# Patient Record
Sex: FEMALE | Race: WHITE | NOT HISPANIC OR LATINO | Employment: OTHER
[De-identification: names, ages, dates, MRNs, and addresses within clinical notes are randomized per-mention and may not be internally consistent; named-entity substitution may affect disease eponyms.]

---

## 2017-01-06 ENCOUNTER — HOSPITAL (OUTPATIENT)
Dept: OTHER | Age: 62
End: 2017-01-06
Attending: INTERNAL MEDICINE

## 2018-01-02 ENCOUNTER — MYAURORA ACCOUNT LINK (OUTPATIENT)
Dept: OTHER | Age: 63
End: 2018-01-02

## 2018-01-02 ENCOUNTER — CHARTING TRANS (OUTPATIENT)
Dept: OTHER | Age: 63
End: 2018-01-02

## 2018-01-27 ENCOUNTER — IMAGING SERVICES (OUTPATIENT)
Dept: OTHER | Age: 63
End: 2018-01-27

## 2018-01-27 ENCOUNTER — HOSPITAL (OUTPATIENT)
Dept: OTHER | Age: 63
End: 2018-01-27
Attending: INTERNAL MEDICINE

## 2018-01-29 ENCOUNTER — CHARTING TRANS (OUTPATIENT)
Dept: OTHER | Age: 63
End: 2018-01-29

## 2018-04-04 ENCOUNTER — HOSPITAL (OUTPATIENT)
Dept: OTHER | Age: 63
End: 2018-04-04
Attending: INTERNAL MEDICINE

## 2018-04-09 ENCOUNTER — LAB SERVICES (OUTPATIENT)
Dept: OTHER | Age: 63
End: 2018-04-09

## 2018-04-09 ENCOUNTER — CHARTING TRANS (OUTPATIENT)
Dept: OTHER | Age: 63
End: 2018-04-09

## 2018-04-09 LAB — RAPID STREP GROUP A: NORMAL

## 2018-04-09 ASSESSMENT — PAIN SCALES - GENERAL: PAINLEVEL_OUTOF10: 0

## 2018-05-18 ENCOUNTER — LAB SERVICES (OUTPATIENT)
Dept: OTHER | Age: 63
End: 2018-05-18

## 2018-05-18 ENCOUNTER — CHARTING TRANS (OUTPATIENT)
Dept: OTHER | Age: 63
End: 2018-05-18

## 2018-05-18 LAB — RAPID STREP GROUP A: NORMAL

## 2018-06-27 ENCOUNTER — IMAGING SERVICES (OUTPATIENT)
Dept: OTHER | Age: 63
End: 2018-06-27

## 2018-06-27 ENCOUNTER — HOSPITAL (OUTPATIENT)
Dept: OTHER | Age: 63
End: 2018-06-27
Attending: INTERNAL MEDICINE

## 2018-07-27 ENCOUNTER — CHARTING TRANS (OUTPATIENT)
Dept: OTHER | Age: 63
End: 2018-07-27

## 2018-07-27 ENCOUNTER — LAB SERVICES (OUTPATIENT)
Dept: OTHER | Age: 63
End: 2018-07-27

## 2018-07-27 ENCOUNTER — MYAURORA ACCOUNT LINK (OUTPATIENT)
Dept: OTHER | Age: 63
End: 2018-07-27

## 2018-07-28 ENCOUNTER — CHARTING TRANS (OUTPATIENT)
Dept: OTHER | Age: 63
End: 2018-07-28

## 2018-07-28 LAB
ALBUMIN SERPL-MCNC: 4 G/DL (ref 3.6–5.1)
ALBUMIN/GLOB SERPL: 1.4 (ref 1–2.4)
ALP SERPL-CCNC: 77 UNITS/L (ref 45–117)
ALT SERPL-CCNC: 27 UNITS/L
ANION GAP SERPL CALC-SCNC: 13 MMOL/L (ref 10–20)
AST SERPL-CCNC: 24 UNITS/L
BASOPHILS # BLD: 0.1 K/MCL (ref 0–0.3)
BASOPHILS NFR BLD: 1 %
BILIRUB SERPL-MCNC: 0.6 MG/DL (ref 0.2–1)
BUN SERPL-MCNC: 18 MG/DL (ref 6–20)
BUN/CREAT SERPL: 22 (ref 7–25)
CALCIUM SERPL-MCNC: 9.7 MG/DL (ref 8.4–10.2)
CHLORIDE SERPL-SCNC: 104 MMOL/L (ref 98–107)
CHOLEST SERPL-MCNC: 220 MG/DL
CHOLEST/HDLC SERPL: 3.5
CO2 SERPL-SCNC: 30 MMOL/L (ref 21–32)
CREAT SERPL-MCNC: 0.8 MG/DL (ref 0.51–0.95)
DIFFERENTIAL METHOD BLD: ABNORMAL
EOSINOPHIL # BLD: 0.1 K/MCL (ref 0.1–0.5)
EOSINOPHIL NFR BLD: 2 %
ERYTHROCYTE [DISTWIDTH] IN BLOOD: 13.7 % (ref 11–15)
GLOBULIN SER-MCNC: 2.9 G/DL (ref 2–4)
GLUCOSE SERPL-MCNC: 93 MG/DL (ref 65–99)
HDLC SERPL-MCNC: 62 MG/DL
HEMATOCRIT: 45.4 % (ref 36–46.5)
HEMOGLOBIN: 14.7 G/DL (ref 12–15.5)
IMM GRANULOCYTES # BLD AUTO: 0 K/MCL (ref 0–0.2)
IMM GRANULOCYTES NFR BLD: 0 %
LDLC SERPL CALC-MCNC: 138 MG/DL
LENGTH OF FAST TIME PATIENT: ABNORMAL HRS
LENGTH OF FAST TIME PATIENT: ABNORMAL HRS
LYMPHOCYTES # BLD: 1.5 K/MCL (ref 1–4)
LYMPHOCYTES NFR BLD: 28 %
MEAN CORPUSCULAR HEMOGLOBIN: 31.1 PG (ref 26–34)
MEAN CORPUSCULAR HGB CONC: 32.4 G/DL (ref 32–36.5)
MEAN CORPUSCULAR VOLUME: 96.2 FL (ref 78–100)
MONOCYTES # BLD: 0.5 K/MCL (ref 0.3–0.9)
MONOCYTES NFR BLD: 10 %
NEUTROPHILS # BLD: 3.1 K/MCL (ref 1.8–7.7)
NEUTROPHILS NFR BLD: 59 %
NONHDLC SERPL-MCNC: 158 MG/DL
NRBC (NRBCRE): 0 /100 WBC
PLATELET COUNT: 301 K/MCL (ref 140–450)
POTASSIUM SERPL-SCNC: 4.2 MMOL/L (ref 3.4–5.1)
RED CELL COUNT: 4.72 MIL/MCL (ref 4–5.2)
SODIUM SERPL-SCNC: 143 MMOL/L (ref 135–145)
TOTAL PROTEIN: 6.9 G/DL (ref 6.4–8.2)
TRIGL SERPL-MCNC: 101 MG/DL
TSH SERPL-ACNC: 1.97 MCUNITS/ML (ref 0.35–5)
WHITE BLOOD COUNT: 5.3 K/MCL (ref 4.2–11)

## 2018-10-31 VITALS
HEART RATE: 90 BPM | BODY MASS INDEX: 21.93 KG/M2 | HEIGHT: 65 IN | SYSTOLIC BLOOD PRESSURE: 110 MMHG | WEIGHT: 131.62 LBS | TEMPERATURE: 98.8 F | DIASTOLIC BLOOD PRESSURE: 64 MMHG

## 2018-11-01 VITALS
DIASTOLIC BLOOD PRESSURE: 68 MMHG | TEMPERATURE: 99.8 F | HEART RATE: 98 BPM | OXYGEN SATURATION: 98 % | RESPIRATION RATE: 16 BRPM | SYSTOLIC BLOOD PRESSURE: 100 MMHG

## 2018-11-01 VITALS
HEIGHT: 65 IN | HEART RATE: 90 BPM | BODY MASS INDEX: 21.99 KG/M2 | SYSTOLIC BLOOD PRESSURE: 139 MMHG | TEMPERATURE: 98.6 F | WEIGHT: 132 LBS | DIASTOLIC BLOOD PRESSURE: 68 MMHG | OXYGEN SATURATION: 98 %

## 2018-11-02 VITALS
SYSTOLIC BLOOD PRESSURE: 133 MMHG | DIASTOLIC BLOOD PRESSURE: 86 MMHG | WEIGHT: 138 LBS | HEART RATE: 79 BPM | TEMPERATURE: 96.1 F

## 2018-12-01 ENCOUNTER — PRIOR ORIGINAL RECORDS (OUTPATIENT)
Dept: HEALTH INFORMATION MANAGEMENT | Facility: OTHER | Age: 63
End: 2018-12-01

## 2018-12-26 ENCOUNTER — TELEPHONE (OUTPATIENT)
Dept: SCHEDULING | Age: 63
End: 2018-12-26

## 2018-12-27 RX ORDER — RANITIDINE 150 MG/1
TABLET ORAL
COMMUNITY
End: 2020-01-08 | Stop reason: ALTCHOICE

## 2018-12-27 RX ORDER — VIT A/VIT C/VIT E/ZINC/COPPER 4296-226
CAPSULE ORAL
COMMUNITY
End: 2021-09-21 | Stop reason: ALTCHOICE

## 2018-12-27 RX ORDER — AMITRIPTYLINE HYDROCHLORIDE 10 MG/1
TABLET, FILM COATED ORAL
COMMUNITY
Start: 2018-02-01 | End: 2019-02-03 | Stop reason: SDUPTHER

## 2018-12-27 RX ORDER — MOMETASONE FUROATE 50 UG/1
SPRAY, METERED NASAL
COMMUNITY
End: 2021-09-21 | Stop reason: ALTCHOICE

## 2018-12-27 RX ORDER — AZELASTINE 1 MG/ML
SPRAY, METERED NASAL
COMMUNITY

## 2018-12-27 RX ORDER — PAROXETINE HYDROCHLORIDE 20 MG/1
TABLET, FILM COATED ORAL
COMMUNITY
Start: 2018-06-11 | End: 2019-01-26 | Stop reason: SDUPTHER

## 2019-01-26 DIAGNOSIS — M79.7 FIBROMYALGIA: Primary | ICD-10-CM

## 2019-01-28 RX ORDER — PAROXETINE HYDROCHLORIDE 20 MG/1
TABLET, FILM COATED ORAL
Qty: 90 TABLET | Refills: 0 | Status: SHIPPED | OUTPATIENT
Start: 2019-01-28 | End: 2019-05-10 | Stop reason: SDUPTHER

## 2019-02-03 DIAGNOSIS — M79.7 FIBROMYALGIA: Primary | ICD-10-CM

## 2019-02-04 RX ORDER — AMITRIPTYLINE HYDROCHLORIDE 10 MG/1
TABLET, FILM COATED ORAL
Qty: 270 TABLET | Refills: 0 | Status: SHIPPED | OUTPATIENT
Start: 2019-02-04 | End: 2019-04-23 | Stop reason: SDUPTHER

## 2019-02-08 ENCOUNTER — OFFICE VISIT (OUTPATIENT)
Dept: RHEUMATOLOGY | Age: 64
End: 2019-02-08

## 2019-02-08 VITALS
HEART RATE: 81 BPM | TEMPERATURE: 97.7 F | DIASTOLIC BLOOD PRESSURE: 76 MMHG | SYSTOLIC BLOOD PRESSURE: 133 MMHG | BODY MASS INDEX: 22.66 KG/M2 | HEIGHT: 65 IN | WEIGHT: 136 LBS

## 2019-02-08 DIAGNOSIS — M79.7 FIBROMYALGIA SYNDROME: Primary | ICD-10-CM

## 2019-02-08 DIAGNOSIS — M85.80 OSTEOPENIA, UNSPECIFIED LOCATION: ICD-10-CM

## 2019-02-08 PROCEDURE — 99213 OFFICE O/P EST LOW 20 MIN: CPT | Performed by: INTERNAL MEDICINE

## 2019-02-08 RX ORDER — AMITRIPTYLINE HYDROCHLORIDE 10 MG/1
TABLET, FILM COATED ORAL
COMMUNITY
Start: 2018-11-26 | End: 2019-11-09 | Stop reason: SDUPTHER

## 2019-02-13 PROBLEM — M79.7 FIBROMYALGIA SYNDROME: Status: ACTIVE | Noted: 2019-02-13

## 2019-02-13 PROBLEM — M85.80 OSTEOPENIA: Status: ACTIVE | Noted: 2019-02-13

## 2019-02-13 SDOH — HEALTH STABILITY: MENTAL HEALTH: HOW OFTEN DO YOU HAVE A DRINK CONTAINING ALCOHOL?: NEVER

## 2019-03-27 ENCOUNTER — OFFICE VISIT (OUTPATIENT)
Dept: INTERNAL MEDICINE | Age: 64
End: 2019-03-27

## 2019-03-27 VITALS
BODY MASS INDEX: 21.99 KG/M2 | OXYGEN SATURATION: 97 % | WEIGHT: 132 LBS | HEART RATE: 84 BPM | DIASTOLIC BLOOD PRESSURE: 67 MMHG | TEMPERATURE: 97.8 F | HEIGHT: 65 IN | SYSTOLIC BLOOD PRESSURE: 132 MMHG

## 2019-03-27 DIAGNOSIS — M79.7 FIBROMYALGIA SYNDROME: ICD-10-CM

## 2019-03-27 DIAGNOSIS — E55.9 VITAMIN D DEFICIENCY: ICD-10-CM

## 2019-03-27 DIAGNOSIS — M75.82 TENDINITIS OF LEFT ROTATOR CUFF: Primary | ICD-10-CM

## 2019-03-27 DIAGNOSIS — M54.9 PAIN, UPPER BACK: ICD-10-CM

## 2019-03-27 PROBLEM — M85.859 OSTEOPENIA OF FEMORAL NECK: Status: ACTIVE | Noted: 2019-02-13

## 2019-03-27 PROBLEM — E78.00 PURE HYPERCHOLESTEROLEMIA: Status: ACTIVE | Noted: 2019-03-27

## 2019-03-27 PROBLEM — Z82.49 FAMILY HISTORY OF CARDIAC DISORDER: Status: ACTIVE | Noted: 2019-03-27

## 2019-03-27 PROBLEM — Z83.3 FH: DIABETES MELLITUS: Status: ACTIVE | Noted: 2019-03-27

## 2019-03-27 PROBLEM — K21.9 ESOPHAGEAL REFLUX: Status: ACTIVE | Noted: 2019-03-27

## 2019-03-27 PROBLEM — I78.1 NON-NEOPLASTIC NEVUS: Status: ACTIVE | Noted: 2019-03-27

## 2019-03-27 PROBLEM — J30.9 ALLERGIC RHINITIS: Status: ACTIVE | Noted: 2019-03-27

## 2019-03-27 PROBLEM — C53.9 MALIGNANT TUMOR OF CERVIX (CMD): Status: ACTIVE | Noted: 2019-03-27

## 2019-03-27 PROCEDURE — 99213 OFFICE O/P EST LOW 20 MIN: CPT | Performed by: INTERNAL MEDICINE

## 2019-03-27 RX ORDER — TIZANIDINE HYDROCHLORIDE 2 MG/1
2 CAPSULE, GELATIN COATED ORAL 3 TIMES DAILY
Qty: 30 CAPSULE | Refills: 0 | Status: SHIPPED | OUTPATIENT
Start: 2019-03-27 | End: 2019-11-26 | Stop reason: ALTCHOICE

## 2019-03-27 RX ORDER — NAPROXEN 500 MG/1
500 TABLET ORAL 2 TIMES DAILY
Qty: 28 TABLET | Refills: 0 | Status: SHIPPED | OUTPATIENT
Start: 2019-03-27 | End: 2020-01-08 | Stop reason: ALTCHOICE

## 2019-03-27 ASSESSMENT — ENCOUNTER SYMPTOMS
FEVER: 0
ACTIVITY CHANGE: 0
COUGH: 0
SHORTNESS OF BREATH: 0
FATIGUE: 0

## 2019-03-28 ENCOUNTER — HOSPITAL (OUTPATIENT)
Dept: OTHER | Age: 64
End: 2019-03-28

## 2019-03-28 LAB — 25(OH)D3+25(OH)D2 SERPL-MCNC: 21.2 NG/ML (ref 30–100)

## 2019-04-01 ENCOUNTER — HOSPITAL (OUTPATIENT)
Dept: OTHER | Age: 64
End: 2019-04-01

## 2019-04-12 ENCOUNTER — TELEPHONE (OUTPATIENT)
Dept: FAMILY MEDICINE | Age: 64
End: 2019-04-12

## 2019-04-23 DIAGNOSIS — M79.7 FIBROMYALGIA: ICD-10-CM

## 2019-04-23 RX ORDER — AMITRIPTYLINE HYDROCHLORIDE 10 MG/1
TABLET, FILM COATED ORAL
Qty: 270 TABLET | Refills: 0 | Status: SHIPPED | OUTPATIENT
Start: 2019-04-23 | End: 2019-07-11 | Stop reason: SDUPTHER

## 2019-05-01 ENCOUNTER — HOSPITAL (OUTPATIENT)
Dept: OTHER | Age: 64
End: 2019-05-01

## 2019-05-10 ENCOUNTER — TELEPHONE (OUTPATIENT)
Dept: SCHEDULING | Age: 64
End: 2019-05-10

## 2019-05-10 DIAGNOSIS — M79.7 FIBROMYALGIA: ICD-10-CM

## 2019-05-10 RX ORDER — PAROXETINE HYDROCHLORIDE 20 MG/1
20 TABLET, FILM COATED ORAL SEE ADMIN INSTRUCTIONS
Qty: 90 TABLET | Refills: 0 | Status: SHIPPED | OUTPATIENT
Start: 2019-05-10 | End: 2019-08-22 | Stop reason: SDUPTHER

## 2019-06-01 ENCOUNTER — HOSPITAL (OUTPATIENT)
Dept: OTHER | Age: 64
End: 2019-06-01
Attending: INTERNAL MEDICINE

## 2019-07-11 DIAGNOSIS — M79.7 FIBROMYALGIA: ICD-10-CM

## 2019-07-11 RX ORDER — AMITRIPTYLINE HYDROCHLORIDE 10 MG/1
TABLET, FILM COATED ORAL
Qty: 270 TABLET | Refills: 0 | Status: SHIPPED | OUTPATIENT
Start: 2019-07-11 | End: 2019-08-22 | Stop reason: SDUPTHER

## 2019-08-15 ENCOUNTER — HOSPITAL (OUTPATIENT)
Dept: OTHER | Age: 64
End: 2019-08-15
Attending: INTERNAL MEDICINE

## 2019-08-15 DIAGNOSIS — R92.2 DENSE BREASTS: Primary | ICD-10-CM

## 2019-08-15 DIAGNOSIS — Z12.39 BREAST CANCER SCREENING OTHER THAN MAMMOGRAM: ICD-10-CM

## 2019-08-15 DIAGNOSIS — R92.30 DENSE BREASTS: Primary | ICD-10-CM

## 2019-08-22 ENCOUNTER — OFFICE VISIT (OUTPATIENT)
Dept: RHEUMATOLOGY | Age: 64
End: 2019-08-22

## 2019-08-22 VITALS
BODY MASS INDEX: 21.99 KG/M2 | SYSTOLIC BLOOD PRESSURE: 130 MMHG | HEIGHT: 65 IN | TEMPERATURE: 98.4 F | DIASTOLIC BLOOD PRESSURE: 75 MMHG | WEIGHT: 132 LBS | HEART RATE: 89 BPM

## 2019-08-22 DIAGNOSIS — M79.7 FIBROMYALGIA: ICD-10-CM

## 2019-08-22 DIAGNOSIS — M85.89 OSTEOPENIA OF MULTIPLE SITES: Primary | ICD-10-CM

## 2019-08-22 PROCEDURE — 99213 OFFICE O/P EST LOW 20 MIN: CPT | Performed by: INTERNAL MEDICINE

## 2019-08-22 RX ORDER — PAROXETINE HYDROCHLORIDE 20 MG/1
20 TABLET, FILM COATED ORAL SEE ADMIN INSTRUCTIONS
Qty: 90 TABLET | Refills: 1 | Status: SHIPPED | OUTPATIENT
Start: 2019-08-22 | End: 2020-01-08 | Stop reason: ALTCHOICE

## 2019-08-22 RX ORDER — AMITRIPTYLINE HYDROCHLORIDE 10 MG/1
30 TABLET, FILM COATED ORAL NIGHTLY
Qty: 270 TABLET | Refills: 1 | Status: SHIPPED | OUTPATIENT
Start: 2019-08-22 | End: 2020-03-16

## 2019-09-10 ENCOUNTER — TELEPHONE (OUTPATIENT)
Dept: SCHEDULING | Age: 64
End: 2019-09-10

## 2019-09-13 ENCOUNTER — WALK IN (OUTPATIENT)
Dept: URGENT CARE | Age: 64
End: 2019-09-13

## 2019-09-13 DIAGNOSIS — Z23 NEED FOR IMMUNIZATION AGAINST INFLUENZA: ICD-10-CM

## 2019-09-13 DIAGNOSIS — Z11.1 SCREENING-PULMONARY TB: Primary | ICD-10-CM

## 2019-09-13 PROCEDURE — G0008 ADMIN INFLUENZA VIRUS VAC: HCPCS | Performed by: NURSE PRACTITIONER

## 2019-09-13 PROCEDURE — 90662 IIV NO PRSV INCREASED AG IM: CPT | Performed by: NURSE PRACTITIONER

## 2019-09-13 PROCEDURE — 86580 TB INTRADERMAL TEST: CPT | Performed by: NURSE PRACTITIONER

## 2019-09-14 ENCOUNTER — TELEPHONE (OUTPATIENT)
Dept: SCHEDULING | Age: 64
End: 2019-09-14

## 2019-09-16 ENCOUNTER — WALK IN (OUTPATIENT)
Dept: URGENT CARE | Age: 64
End: 2019-09-16

## 2019-09-16 DIAGNOSIS — Z11.1 ENCOUNTER FOR PPD SKIN TEST READING: Primary | ICD-10-CM

## 2019-09-16 LAB
INDURATION: 0 MM (ref 0–?)
SKIN TEST RESULT: NEGATIVE

## 2019-09-16 PROCEDURE — X1094 NO CHARGE VISIT: HCPCS | Performed by: NURSE PRACTITIONER

## 2019-11-09 PROBLEM — M85.89 OSTEOPENIA OF MULTIPLE SITES: Status: ACTIVE | Noted: 2019-11-09

## 2019-11-26 ENCOUNTER — OFFICE VISIT (OUTPATIENT)
Dept: GASTROENTEROLOGY | Age: 64
End: 2019-11-26

## 2019-11-26 VITALS
HEART RATE: 88 BPM | SYSTOLIC BLOOD PRESSURE: 116 MMHG | BODY MASS INDEX: 22.42 KG/M2 | WEIGHT: 134.6 LBS | DIASTOLIC BLOOD PRESSURE: 67 MMHG | HEIGHT: 65 IN

## 2019-11-26 DIAGNOSIS — R12 HEARTBURN: Primary | ICD-10-CM

## 2019-11-26 DIAGNOSIS — Z12.11 SPECIAL SCREENING FOR MALIGNANT NEOPLASMS, COLON: ICD-10-CM

## 2019-11-26 PROCEDURE — 99204 OFFICE O/P NEW MOD 45 MIN: CPT | Performed by: INTERNAL MEDICINE

## 2019-11-26 RX ORDER — OMEPRAZOLE 40 MG/1
40 CAPSULE, DELAYED RELEASE ORAL DAILY
Qty: 30 CAPSULE | Refills: 5 | Status: SHIPPED | OUTPATIENT
Start: 2019-11-26 | End: 2019-12-27 | Stop reason: SDUPTHER

## 2019-11-26 RX ORDER — ESOMEPRAZOLE MAGNESIUM 20 MG/1
TABLET, DELAYED RELEASE ORAL
COMMUNITY
End: 2020-01-08 | Stop reason: ALTCHOICE

## 2019-11-26 RX ORDER — PREDNISONE 5 MG/1
TABLET ORAL
COMMUNITY
Start: 2019-11-16 | End: 2020-01-08 | Stop reason: ALTCHOICE

## 2019-11-26 RX ORDER — POLYETHYLENE GLYCOL 3350, SODIUM CHLORIDE, SODIUM BICARBONATE, POTASSIUM CHLORIDE 420; 11.2; 5.72; 1.48 G/4L; G/4L; G/4L; G/4L
POWDER, FOR SOLUTION ORAL
Qty: 4000 ML | Refills: 0 | Status: SHIPPED | OUTPATIENT
Start: 2019-11-26 | End: 2020-01-08 | Stop reason: ALTCHOICE

## 2019-11-26 SDOH — HEALTH STABILITY: MENTAL HEALTH: HOW OFTEN DO YOU HAVE A DRINK CONTAINING ALCOHOL?: NOT ASKED

## 2019-12-16 ENCOUNTER — HOSPITAL (OUTPATIENT)
Dept: OTHER | Age: 64
End: 2019-12-16

## 2019-12-16 PROCEDURE — G0121 COLON CA SCRN NOT HI RSK IND: HCPCS | Performed by: INTERNAL MEDICINE

## 2019-12-16 PROCEDURE — 43239 EGD BIOPSY SINGLE/MULTIPLE: CPT | Performed by: INTERNAL MEDICINE

## 2019-12-18 ENCOUNTER — TELEPHONE (OUTPATIENT)
Dept: SCHEDULING | Age: 64
End: 2019-12-18

## 2019-12-18 LAB — PATHOLOGIST NAME: NORMAL

## 2019-12-20 ENCOUNTER — E-ADVICE (OUTPATIENT)
Dept: INTERNAL MEDICINE | Age: 64
End: 2019-12-20

## 2019-12-20 ENCOUNTER — E-ADVICE (OUTPATIENT)
Dept: GASTROENTEROLOGY | Age: 64
End: 2019-12-20

## 2019-12-24 ENCOUNTER — TELEPHONE (OUTPATIENT)
Dept: SCHEDULING | Age: 64
End: 2019-12-24

## 2019-12-27 ENCOUNTER — TELEPHONE (OUTPATIENT)
Dept: GASTROENTEROLOGY | Age: 64
End: 2019-12-27

## 2019-12-27 RX ORDER — OMEPRAZOLE 40 MG/1
40 CAPSULE, DELAYED RELEASE ORAL DAILY
Qty: 90 CAPSULE | Refills: 3 | Status: SHIPPED | OUTPATIENT
Start: 2019-12-27 | End: 2021-03-05 | Stop reason: SDUPTHER

## 2019-12-31 ENCOUNTER — TELEPHONE (OUTPATIENT)
Dept: SCHEDULING | Age: 64
End: 2019-12-31

## 2019-12-31 DIAGNOSIS — M85.89 OSTEOPENIA OF MULTIPLE SITES: Primary | ICD-10-CM

## 2020-01-08 ENCOUNTER — OFFICE VISIT (OUTPATIENT)
Dept: FAMILY MEDICINE | Age: 65
End: 2020-01-08

## 2020-01-08 VITALS
DIASTOLIC BLOOD PRESSURE: 73 MMHG | SYSTOLIC BLOOD PRESSURE: 121 MMHG | OXYGEN SATURATION: 99 % | HEIGHT: 65 IN | RESPIRATION RATE: 16 BRPM | HEART RATE: 89 BPM | TEMPERATURE: 98 F | BODY MASS INDEX: 22.41 KG/M2 | WEIGHT: 134.48 LBS

## 2020-01-08 DIAGNOSIS — E78.00 PURE HYPERCHOLESTEROLEMIA: ICD-10-CM

## 2020-01-08 DIAGNOSIS — E55.9 VITAMIN D DEFICIENCY: Primary | ICD-10-CM

## 2020-01-08 LAB
25(OH)D3+25(OH)D2 SERPL-MCNC: 40.9 NG/ML (ref 30–100)
ALBUMIN SERPL-MCNC: 4.2 G/DL (ref 3.6–5.1)
ALBUMIN/GLOB SERPL: 1.7 {RATIO} (ref 1–2.4)
ALP SERPL-CCNC: 80 UNITS/L (ref 45–117)
ALT SERPL-CCNC: 22 UNITS/L
ANION GAP SERPL CALC-SCNC: 10 MMOL/L (ref 10–20)
AST SERPL-CCNC: 20 UNITS/L
BASOPHILS # BLD AUTO: 0.1 K/MCL (ref 0–0.3)
BASOPHILS NFR BLD AUTO: 1 %
BILIRUB SERPL-MCNC: 0.6 MG/DL (ref 0.2–1)
BUN SERPL-MCNC: 19 MG/DL (ref 6–20)
BUN/CREAT SERPL: 27 (ref 7–25)
CALCIUM SERPL-MCNC: 9.6 MG/DL (ref 8.4–10.2)
CHLORIDE SERPL-SCNC: 104 MMOL/L (ref 98–107)
CHOLEST SERPL-MCNC: 230 MG/DL
CHOLEST/HDLC SERPL: 3.5 {RATIO}
CO2 SERPL-SCNC: 29 MMOL/L (ref 21–32)
CREAT SERPL-MCNC: 0.71 MG/DL (ref 0.51–0.95)
DIFFERENTIAL METHOD BLD: NORMAL
EOSINOPHIL # BLD AUTO: 0.2 K/MCL (ref 0.1–0.5)
EOSINOPHIL NFR SPEC: 3 %
ERYTHROCYTE [DISTWIDTH] IN BLOOD: 13.6 % (ref 11–15)
FASTING STATUS PATIENT QL REPORTED: ABNORMAL HRS
GLOBULIN SER-MCNC: 2.5 G/DL (ref 2–4)
GLUCOSE SERPL-MCNC: 88 MG/DL (ref 65–99)
HCT VFR BLD CALC: 43.3 % (ref 36–46.5)
HDLC SERPL-MCNC: 65 MG/DL
HGB BLD-MCNC: 14.2 G/DL (ref 12–15.5)
IMM GRANULOCYTES # BLD AUTO: 0 K/MCL (ref 0–0.2)
IMM GRANULOCYTES NFR BLD: 0 %
LDLC SERPL-MCNC: 148 MG/DL
LENGTH OF FAST TIME PATIENT: ABNORMAL HRS
LYMPHOCYTES # BLD MANUAL: 1.4 K/MCL (ref 1–4)
LYMPHOCYTES NFR BLD MANUAL: 21 %
MCH RBC QN AUTO: 31.5 PG (ref 26–34)
MCHC RBC AUTO-ENTMCNC: 32.8 G/DL (ref 32–36.5)
MCV RBC AUTO: 96 FL (ref 78–100)
MONOCYTES # BLD MANUAL: 0.7 K/MCL (ref 0.3–0.9)
MONOCYTES NFR BLD MANUAL: 10 %
NEUTROPHILS # BLD: 4.5 K/MCL (ref 1.8–7.7)
NEUTROPHILS NFR BLD AUTO: 65 %
NONHDLC SERPL-MCNC: 165 MG/DL
NRBC BLD MANUAL-RTO: 0 /100 WBC
PLATELET # BLD: 221 K/MCL (ref 140–450)
POTASSIUM SERPL-SCNC: 3.9 MMOL/L (ref 3.4–5.1)
PROT SERPL-MCNC: 6.7 G/DL (ref 6.4–8.2)
RBC # BLD: 4.51 MIL/MCL (ref 4–5.2)
SODIUM SERPL-SCNC: 139 MMOL/L (ref 135–145)
TRIGL SERPL-MCNC: 85 MG/DL
WBC # BLD: 6.8 K/MCL (ref 4.2–11)

## 2020-01-08 PROCEDURE — 99214 OFFICE O/P EST MOD 30 MIN: CPT | Performed by: NURSE PRACTITIONER

## 2020-01-14 ENCOUNTER — OFFICE VISIT (OUTPATIENT)
Dept: FAMILY MEDICINE | Age: 65
End: 2020-01-14

## 2020-01-14 VITALS — DIASTOLIC BLOOD PRESSURE: 69 MMHG | OXYGEN SATURATION: 98 % | HEART RATE: 91 BPM | SYSTOLIC BLOOD PRESSURE: 134 MMHG

## 2020-01-14 DIAGNOSIS — S46.812A STRAIN OF LEFT TRAPEZIUS MUSCLE, INITIAL ENCOUNTER: ICD-10-CM

## 2020-01-14 DIAGNOSIS — S46.912A STRAIN OF LEFT SHOULDER, INITIAL ENCOUNTER: Primary | ICD-10-CM

## 2020-01-14 PROCEDURE — 99214 OFFICE O/P EST MOD 30 MIN: CPT | Performed by: NURSE PRACTITIONER

## 2020-01-14 RX ORDER — PAROXETINE HYDROCHLORIDE 20 MG/1
20 TABLET, FILM COATED ORAL EVERY MORNING
COMMUNITY
End: 2020-03-16

## 2020-01-17 ENCOUNTER — HOSPITAL (OUTPATIENT)
Dept: OTHER | Age: 65
End: 2020-01-17
Attending: NURSE PRACTITIONER

## 2020-01-24 ENCOUNTER — HOSPITAL (OUTPATIENT)
Dept: OTHER | Age: 65
End: 2020-01-24
Attending: INTERNAL MEDICINE

## 2020-02-10 ENCOUNTER — HOSPITAL ENCOUNTER (OUTPATIENT)
Dept: ULTRASOUND IMAGING | Age: 65
Discharge: HOME | End: 2020-02-10
Attending: INTERNAL MEDICINE

## 2020-02-10 ENCOUNTER — APPOINTMENT (OUTPATIENT)
Dept: GENERAL RADIOLOGY | Age: 65
End: 2020-02-10
Attending: INTERNAL MEDICINE

## 2020-02-10 DIAGNOSIS — R92.2 DENSE BREASTS: ICD-10-CM

## 2020-02-10 DIAGNOSIS — Z12.39 BREAST CANCER SCREENING OTHER THAN MAMMOGRAM: ICD-10-CM

## 2020-02-10 DIAGNOSIS — R92.30 DENSE BREASTS: ICD-10-CM

## 2020-02-10 PROCEDURE — 76641 ULTRASOUND BREAST COMPLETE: CPT

## 2020-02-21 ENCOUNTER — HOSPITAL ENCOUNTER (OUTPATIENT)
Dept: GENERAL RADIOLOGY | Age: 65
Discharge: HOME | End: 2020-02-21
Attending: INTERNAL MEDICINE

## 2020-02-21 ENCOUNTER — APPOINTMENT (OUTPATIENT)
Dept: GENERAL RADIOLOGY | Age: 65
End: 2020-02-21
Attending: INTERNAL MEDICINE

## 2020-02-21 DIAGNOSIS — M85.89 OSTEOPENIA OF MULTIPLE SITES: ICD-10-CM

## 2020-02-21 PROCEDURE — 77080 DXA BONE DENSITY AXIAL: CPT

## 2020-02-25 ENCOUNTER — HOSPITAL ENCOUNTER (OUTPATIENT)
Dept: PHYSICAL MEDICINE AND REHAB | Age: 65
Discharge: STILL ADMITTED OR ADMINISTRATIVE | End: 2020-02-25
Attending: INTERNAL MEDICINE

## 2020-02-25 DIAGNOSIS — M75.82 TENDINITIS OF LEFT ROTATOR CUFF: ICD-10-CM

## 2020-02-25 PROCEDURE — 97110 THERAPEUTIC EXERCISES: CPT | Performed by: PHYSICAL THERAPIST

## 2020-02-25 PROCEDURE — 97140 MANUAL THERAPY 1/> REGIONS: CPT | Performed by: PHYSICAL THERAPIST

## 2020-02-25 PROCEDURE — 97032 APPL MODALITY 1+ESTIM EA 15: CPT | Performed by: PHYSICAL THERAPIST

## 2020-03-16 DIAGNOSIS — M79.7 FIBROMYALGIA: ICD-10-CM

## 2020-03-16 RX ORDER — PAROXETINE HYDROCHLORIDE 20 MG/1
TABLET, FILM COATED ORAL
Qty: 90 TABLET | Refills: 3 | Status: SHIPPED | OUTPATIENT
Start: 2020-03-16 | End: 2021-03-02

## 2020-03-16 RX ORDER — AMITRIPTYLINE HYDROCHLORIDE 10 MG/1
TABLET, FILM COATED ORAL
Qty: 270 TABLET | Refills: 3 | Status: SHIPPED | OUTPATIENT
Start: 2020-03-16 | End: 2021-03-02

## 2020-03-17 ENCOUNTER — APPOINTMENT (OUTPATIENT)
Dept: PHYSICAL MEDICINE AND REHAB | Age: 65
End: 2020-03-17
Attending: INTERNAL MEDICINE

## 2020-03-18 ENCOUNTER — TELEPHONE (OUTPATIENT)
Dept: SCHEDULING | Age: 65
End: 2020-03-18

## 2020-03-20 ENCOUNTER — TELEPHONE (OUTPATIENT)
Dept: RHEUMATOLOGY | Age: 65
End: 2020-03-20

## 2020-03-23 ENCOUNTER — APPOINTMENT (OUTPATIENT)
Dept: RHEUMATOLOGY | Age: 65
End: 2020-03-23

## 2020-03-24 ENCOUNTER — TELEPHONE (OUTPATIENT)
Dept: PHYSICAL MEDICINE AND REHAB | Age: 65
End: 2020-03-24

## 2020-03-24 ENCOUNTER — APPOINTMENT (OUTPATIENT)
Dept: PHYSICAL MEDICINE AND REHAB | Age: 65
End: 2020-03-24
Attending: INTERNAL MEDICINE

## 2020-03-24 PROCEDURE — 98968 PH1 ASSMT&MGMT NQHP 21-30: CPT | Performed by: PHYSICAL THERAPIST

## 2020-03-31 ENCOUNTER — APPOINTMENT (OUTPATIENT)
Dept: PHYSICAL MEDICINE AND REHAB | Age: 65
End: 2020-03-31
Attending: INTERNAL MEDICINE

## 2020-08-05 ENCOUNTER — TELEPHONE (OUTPATIENT)
Dept: SCHEDULING | Age: 65
End: 2020-08-05

## 2020-08-28 ENCOUNTER — APPOINTMENT (OUTPATIENT)
Dept: RHEUMATOLOGY | Age: 65
End: 2020-08-28

## 2020-08-28 ENCOUNTER — TELEPHONE (OUTPATIENT)
Dept: SCHEDULING | Age: 65
End: 2020-08-28

## 2020-09-03 ENCOUNTER — TELEPHONE (OUTPATIENT)
Dept: SCHEDULING | Age: 65
End: 2020-09-03

## 2020-09-03 ENCOUNTER — V-VISIT (OUTPATIENT)
Dept: RHEUMATOLOGY | Age: 65
End: 2020-09-03

## 2020-09-03 DIAGNOSIS — M65.331 ACQUIRED TRIGGER FINGER OF BOTH MIDDLE FINGERS: ICD-10-CM

## 2020-09-03 DIAGNOSIS — M65.332 ACQUIRED TRIGGER FINGER OF BOTH MIDDLE FINGERS: ICD-10-CM

## 2020-09-03 DIAGNOSIS — M79.7 FIBROMYALGIA SYNDROME: Primary | ICD-10-CM

## 2020-09-03 DIAGNOSIS — M85.89 OSTEOPENIA OF MULTIPLE SITES: ICD-10-CM

## 2020-09-03 PROCEDURE — 99213 OFFICE O/P EST LOW 20 MIN: CPT | Performed by: INTERNAL MEDICINE

## 2020-09-07 PROBLEM — M79.7 FIBROMYALGIA SYNDROME: Status: ACTIVE | Noted: 2020-09-07

## 2020-09-07 PROBLEM — M65.332 ACQUIRED TRIGGER FINGER OF BOTH MIDDLE FINGERS: Status: ACTIVE | Noted: 2020-09-07

## 2020-09-07 PROBLEM — M65.322 ACQUIRED TRIGGER FINGER OF BOTH INDEX FINGERS: Status: ACTIVE | Noted: 2020-09-07

## 2020-09-07 PROBLEM — M65.331 ACQUIRED TRIGGER FINGER OF BOTH MIDDLE FINGERS: Status: ACTIVE | Noted: 2020-09-07

## 2020-09-07 PROBLEM — M65.321 ACQUIRED TRIGGER FINGER OF BOTH INDEX FINGERS: Status: ACTIVE | Noted: 2020-09-07

## 2020-09-11 ENCOUNTER — HOSPITAL ENCOUNTER (OUTPATIENT)
Dept: MAMMOGRAPHY | Age: 65
Discharge: HOME | End: 2020-09-11
Attending: INTERNAL MEDICINE

## 2020-09-11 DIAGNOSIS — Z12.31 ENCOUNTER FOR SCREENING MAMMOGRAM FOR MALIGNANT NEOPLASM OF BREAST: ICD-10-CM

## 2020-09-11 PROCEDURE — 77063 BREAST TOMOSYNTHESIS BI: CPT

## 2020-09-12 DIAGNOSIS — R92.30 DENSE BREASTS: ICD-10-CM

## 2020-09-12 DIAGNOSIS — Z12.39 ENCOUNTER FOR OTHER SCREENING FOR MALIGNANT NEOPLASM OF BREAST: Primary | ICD-10-CM

## 2020-09-12 DIAGNOSIS — R92.2 DENSE BREASTS: ICD-10-CM

## 2020-09-24 ENCOUNTER — OFFICE VISIT (OUTPATIENT)
Dept: INTERNAL MEDICINE | Age: 65
End: 2020-09-24

## 2020-09-24 VITALS
WEIGHT: 132.28 LBS | HEART RATE: 86 BPM | SYSTOLIC BLOOD PRESSURE: 125 MMHG | TEMPERATURE: 96.6 F | OXYGEN SATURATION: 98 % | HEIGHT: 65 IN | BODY MASS INDEX: 22.04 KG/M2 | DIASTOLIC BLOOD PRESSURE: 59 MMHG

## 2020-09-24 DIAGNOSIS — E78.00 PURE HYPERCHOLESTEROLEMIA: ICD-10-CM

## 2020-09-24 DIAGNOSIS — Z23 FLU VACCINE NEED: Primary | ICD-10-CM

## 2020-09-24 DIAGNOSIS — Z91.09 ENVIRONMENTAL ALLERGIES: ICD-10-CM

## 2020-09-24 DIAGNOSIS — C53.9 MALIGNANT NEOPLASM OF CERVIX, UNSPECIFIED SITE (CMD): ICD-10-CM

## 2020-09-24 DIAGNOSIS — Z23 NEED FOR PNEUMOCOCCAL VACCINATION: ICD-10-CM

## 2020-09-24 DIAGNOSIS — M79.7 FIBROMYALGIA: ICD-10-CM

## 2020-09-24 DIAGNOSIS — Z11.59 NEED FOR HEPATITIS C SCREENING TEST: ICD-10-CM

## 2020-09-24 PROBLEM — M65.322 ACQUIRED TRIGGER FINGER OF BOTH INDEX FINGERS: Status: RESOLVED | Noted: 2020-09-07 | Resolved: 2020-09-24

## 2020-09-24 PROBLEM — M75.82 TENDINITIS OF LEFT ROTATOR CUFF: Status: RESOLVED | Noted: 2019-03-27 | Resolved: 2020-09-24

## 2020-09-24 PROBLEM — M65.321 ACQUIRED TRIGGER FINGER OF BOTH INDEX FINGERS: Status: RESOLVED | Noted: 2020-09-07 | Resolved: 2020-09-24

## 2020-09-24 PROBLEM — M54.9 PAIN, UPPER BACK: Status: RESOLVED | Noted: 2019-03-27 | Resolved: 2020-09-24

## 2020-09-24 LAB
CHOLEST SERPL-MCNC: 210 MG/DL
CHOLEST/HDLC SERPL: 3.6 {RATIO}
HDLC SERPL-MCNC: 59 MG/DL
LDLC SERPL CALC-MCNC: 129 MG/DL
LENGTH OF FAST TIME PATIENT: ABNORMAL HRS
NONHDLC SERPL-MCNC: 151 MG/DL
TRIGL SERPL-MCNC: 112 MG/DL

## 2020-09-24 PROCEDURE — 99214 OFFICE O/P EST MOD 30 MIN: CPT | Performed by: INTERNAL MEDICINE

## 2020-09-24 PROCEDURE — G0008 ADMIN INFLUENZA VIRUS VAC: HCPCS

## 2020-09-24 PROCEDURE — G0009 ADMIN PNEUMOCOCCAL VACCINE: HCPCS

## 2020-09-24 PROCEDURE — 90662 IIV NO PRSV INCREASED AG IM: CPT

## 2020-09-24 PROCEDURE — 90670 PCV13 VACCINE IM: CPT

## 2020-09-24 RX ORDER — PREDNISONE 5 MG/1
TABLET ORAL
COMMUNITY
Start: 2020-09-02 | End: 2020-12-30 | Stop reason: ALTCHOICE

## 2020-09-24 SDOH — HEALTH STABILITY: MENTAL HEALTH: HOW OFTEN DO YOU HAVE A DRINK CONTAINING ALCOHOL?: NOT ASKED

## 2020-09-24 ASSESSMENT — PATIENT HEALTH QUESTIONNAIRE - PHQ9
2. FEELING DOWN, DEPRESSED OR HOPELESS: NOT AT ALL
CLINICAL INTERPRETATION OF PHQ2 SCORE: NO FURTHER SCREENING NEEDED
SUM OF ALL RESPONSES TO PHQ9 QUESTIONS 1 AND 2: 0
1. LITTLE INTEREST OR PLEASURE IN DOING THINGS: NOT AT ALL
CLINICAL INTERPRETATION OF PHQ9 SCORE: NO FURTHER SCREENING NEEDED
SUM OF ALL RESPONSES TO PHQ9 QUESTIONS 1 AND 2: 0

## 2020-09-24 ASSESSMENT — ENCOUNTER SYMPTOMS
LIGHT-HEADEDNESS: 0
COUGH: 0
FATIGUE: 0
FEVER: 0
SLEEP DISTURBANCE: 0
ACTIVITY CHANGE: 0
BRUISES/BLEEDS EASILY: 0
WEAKNESS: 0
DIARRHEA: 0
DIZZINESS: 0
SORE THROAT: 0
CONSTIPATION: 0
BLOOD IN STOOL: 0
SHORTNESS OF BREATH: 0
ABDOMINAL PAIN: 0

## 2020-09-25 LAB — HCV AB SER QL: NEGATIVE

## 2020-11-23 ENCOUNTER — HOSPITAL ENCOUNTER (OUTPATIENT)
Dept: LAB | Age: 65
Discharge: HOME | End: 2020-11-23
Attending: ORTHOPAEDIC SURGERY

## 2020-11-23 DIAGNOSIS — M06.9 RHEUMATOID ARTHRITIS, INVOLVING UNSPECIFIED SITE, UNSPECIFIED WHETHER RHEUMATOID FACTOR PRESENT (CMD): Primary | ICD-10-CM

## 2020-11-23 LAB
ALBUMIN SERPL-MCNC: 4 G/DL (ref 3.6–5.1)
ALBUMIN/GLOB SERPL: 1.5 {RATIO} (ref 1–2.4)
ALP SERPL-CCNC: 90 UNITS/L (ref 45–117)
ALT SERPL-CCNC: 34 UNITS/L
ANION GAP SERPL CALC-SCNC: 7 MMOL/L (ref 10–20)
AST SERPL-CCNC: 24 UNITS/L
BASOPHILS # BLD: 0.1 K/MCL (ref 0–0.3)
BASOPHILS NFR BLD: 1 %
BILIRUB SERPL-MCNC: 0.4 MG/DL (ref 0.2–1)
BUN SERPL-MCNC: 18 MG/DL (ref 6–20)
BUN/CREAT SERPL: 25 (ref 7–25)
CALCIUM SERPL-MCNC: 9.4 MG/DL (ref 8.4–10.2)
CHLORIDE SERPL-SCNC: 105 MMOL/L (ref 98–107)
CO2 SERPL-SCNC: 32 MMOL/L (ref 21–32)
CREAT SERPL-MCNC: 0.72 MG/DL (ref 0.51–0.95)
CRP SERPL-MCNC: <0.3 MG/DL
DIFFERENTIAL METHOD BLD: NORMAL
EOSINOPHIL # BLD: 0.1 K/MCL (ref 0.1–0.5)
EOSINOPHIL NFR BLD: 2 %
ERYTHROCYTE [DISTWIDTH] IN BLOOD: 12.7 % (ref 11–15)
ERYTHROCYTE [SEDIMENTATION RATE] IN BLOOD BY WESTERGREN METHOD: 12 MM/HR (ref 0–20)
GLOBULIN SER-MCNC: 2.7 G/DL (ref 2–4)
GLUCOSE SERPL-MCNC: 70 MG/DL (ref 65–99)
HCT VFR BLD CALC: 39.2 % (ref 36–46.5)
HGB BLD-MCNC: 13.3 G/DL (ref 12–15.5)
IMM GRANULOCYTES # BLD AUTO: 0 K/MCL (ref 0–0.2)
IMM GRANULOCYTES NFR BLD: 0 %
LYMPHOCYTES # BLD: 1.5 K/MCL (ref 1–4)
LYMPHOCYTES NFR BLD: 23 %
MCH RBC QN AUTO: 32 PG (ref 26–34)
MCHC RBC AUTO-ENTMCNC: 33.9 G/DL (ref 32–36.5)
MCV RBC AUTO: 94.5 FL (ref 78–100)
MONOCYTES # BLD: 0.5 K/MCL (ref 0.3–0.9)
MONOCYTES NFR BLD: 8 %
NEUTROPHILS # BLD: 4.2 K/MCL (ref 1.8–7.7)
NEUTROPHILS NFR BLD: 66 %
NRBC BLD MANUAL-RTO: 0 /100 WBC
PLATELET # BLD: 248 K/MCL (ref 140–450)
POTASSIUM SERPL-SCNC: 4.1 MMOL/L (ref 3.4–5.1)
PROT SERPL-MCNC: 6.7 G/DL (ref 6.4–8.2)
RBC # BLD: 4.15 MIL/MCL (ref 4–5.2)
SODIUM SERPL-SCNC: 140 MMOL/L (ref 135–145)
TSH SERPL-ACNC: 2.34 MCUNITS/ML (ref 0.35–5)
URATE SERPL-MCNC: 4 MG/DL (ref 2.6–5.9)
WBC # BLD: 6.4 K/MCL (ref 4.2–11)

## 2020-11-23 PROCEDURE — 36415 COLL VENOUS BLD VENIPUNCTURE: CPT

## 2020-11-23 PROCEDURE — 80053 COMPREHEN METABOLIC PANEL: CPT

## 2020-11-23 PROCEDURE — 84443 ASSAY THYROID STIM HORMONE: CPT

## 2020-11-23 PROCEDURE — 86431 RHEUMATOID FACTOR QUANT: CPT

## 2020-11-23 PROCEDURE — 85652 RBC SED RATE AUTOMATED: CPT

## 2020-11-23 PROCEDURE — 85390 FIBRINOLYSINS SCREEN I&R: CPT

## 2020-11-23 PROCEDURE — 86038 ANTINUCLEAR ANTIBODIES: CPT

## 2020-11-23 PROCEDURE — 86140 C-REACTIVE PROTEIN: CPT

## 2020-11-23 PROCEDURE — 86146 BETA-2 GLYCOPROTEIN ANTIBODY: CPT

## 2020-11-23 PROCEDURE — 86618 LYME DISEASE ANTIBODY: CPT

## 2020-11-23 PROCEDURE — 86200 CCP ANTIBODY: CPT

## 2020-11-23 PROCEDURE — 85025 COMPLETE CBC W/AUTO DIFF WBC: CPT

## 2020-11-23 PROCEDURE — 84550 ASSAY OF BLOOD/URIC ACID: CPT

## 2020-11-24 LAB
ANA SER QL IA: NEGATIVE
APTT-LA INSENS PPP: 25.6 SEC (ref 22–30)
B BURGDOR IGG+IGM SER QL: NEGATIVE
CCP AB SER IA-ACNC: 2 UNITS
LA 3 SCREEN W REFLEX-IMP: NORMAL
MIXING APTT: 24.7 SEC (ref 22–30)
MIXING DRVVT: 34.6 SEC
PATHOLOGIST NAME: NORMAL
RHEUMATOID FACT SER NEPH-ACNC: <10 UNITS/ML
SCREEN DRVVT: 33.8 SEC
THROMBIN TIME: 17.1 SEC (ref 15.3–21.1)

## 2020-11-25 LAB
B2 GLYCOPROT1 IGA SERPL IA-ACNC: <20 SAU
B2 GLYCOPROT1 IGG SERPL IA-ACNC: <20 SGU
B2 GLYCOPROT1 IGM SERPL IA-ACNC: <20 SMU

## 2020-12-21 DIAGNOSIS — Z00.6 EXAMINATION OF PARTICIPANT OR CONTROL IN CLINICAL RESEARCH: Primary | ICD-10-CM

## 2020-12-30 ENCOUNTER — WALK IN (OUTPATIENT)
Dept: URGENT CARE | Age: 65
End: 2020-12-30

## 2020-12-30 VITALS
HEART RATE: 96 BPM | RESPIRATION RATE: 16 BRPM | WEIGHT: 134.15 LBS | DIASTOLIC BLOOD PRESSURE: 77 MMHG | SYSTOLIC BLOOD PRESSURE: 135 MMHG | OXYGEN SATURATION: 99 % | TEMPERATURE: 98.5 F | BODY MASS INDEX: 22.35 KG/M2 | HEIGHT: 65 IN

## 2020-12-30 DIAGNOSIS — R05.9 COUGH: ICD-10-CM

## 2020-12-30 DIAGNOSIS — J06.9 VIRAL URI WITH COUGH: Primary | ICD-10-CM

## 2020-12-30 LAB — SARS-COV-2 AG RESP QL IA.RAPID: NOT DETECTED

## 2020-12-30 PROCEDURE — 99213 OFFICE O/P EST LOW 20 MIN: CPT | Performed by: NURSE PRACTITIONER

## 2020-12-30 PROCEDURE — 87426 SARSCOV CORONAVIRUS AG IA: CPT | Performed by: NURSE PRACTITIONER

## 2021-01-01 ENCOUNTER — EXTERNAL RECORD (OUTPATIENT)
Dept: HEALTH INFORMATION MANAGEMENT | Facility: OTHER | Age: 66
End: 2021-01-01

## 2021-01-25 ENCOUNTER — WALK IN (OUTPATIENT)
Dept: URGENT CARE | Age: 66
End: 2021-01-25

## 2021-01-25 ENCOUNTER — TELEPHONE (OUTPATIENT)
Dept: SCHEDULING | Age: 66
End: 2021-01-25

## 2021-01-25 VITALS
SYSTOLIC BLOOD PRESSURE: 115 MMHG | DIASTOLIC BLOOD PRESSURE: 61 MMHG | WEIGHT: 132 LBS | BODY MASS INDEX: 21.99 KG/M2 | TEMPERATURE: 98.1 F | HEIGHT: 65 IN | HEART RATE: 89 BPM

## 2021-01-25 DIAGNOSIS — J02.9 PHARYNGITIS, UNSPECIFIED ETIOLOGY: ICD-10-CM

## 2021-01-25 DIAGNOSIS — R59.9 GLANDS SWOLLEN: Primary | ICD-10-CM

## 2021-01-25 DIAGNOSIS — R52 BODY ACHES: ICD-10-CM

## 2021-01-25 LAB
INTERNAL PROCEDURAL CONTROLS ACCEPTABLE: YES
S PYO AG THROAT QL IA.RAPID: NEGATIVE
SARS-COV-2 AG RESP QL IA.RAPID: NOT DETECTED

## 2021-01-25 PROCEDURE — 87651 STREP A DNA AMP PROBE: CPT | Performed by: CLINICAL MEDICAL LABORATORY

## 2021-01-25 PROCEDURE — 99213 OFFICE O/P EST LOW 20 MIN: CPT | Performed by: NURSE PRACTITIONER

## 2021-01-25 PROCEDURE — 87880 STREP A ASSAY W/OPTIC: CPT | Performed by: PHYSICIAN ASSISTANT

## 2021-01-25 PROCEDURE — 87426 SARSCOV CORONAVIRUS AG IA: CPT | Performed by: NURSE PRACTITIONER

## 2021-01-25 ASSESSMENT — ENCOUNTER SYMPTOMS
HEADACHES: 1
SORE THROAT: 1
ADENOPATHY: 1

## 2021-01-26 DIAGNOSIS — Z00.6 EXAMINATION OF PARTICIPANT OR CONTROL IN CLINICAL RESEARCH: Primary | ICD-10-CM

## 2021-01-26 LAB — S PYO DNA THROAT QL NAA+PROBE: NOT DETECTED

## 2021-02-01 ENCOUNTER — APPOINTMENT (OUTPATIENT)
Dept: NEUROLOGY | Age: 66
End: 2021-02-01

## 2021-02-01 DIAGNOSIS — Z00.6 EXAMINATION OF PARTICIPANT OR CONTROL IN CLINICAL RESEARCH: ICD-10-CM

## 2021-02-01 LAB
ALBUMIN SERPL-MCNC: 4.2 G/DL (ref 3.6–5.1)
ALBUMIN/GLOB SERPL: 1.7 {RATIO} (ref 1–2.4)
ALP SERPL-CCNC: 101 UNITS/L (ref 45–117)
ALT SERPL-CCNC: 28 UNITS/L
ANION GAP SERPL CALC-SCNC: 10 MMOL/L (ref 10–20)
AST SERPL-CCNC: 27 UNITS/L
BILIRUB SERPL-MCNC: 0.6 MG/DL (ref 0.2–1)
BUN SERPL-MCNC: 19 MG/DL (ref 6–20)
BUN/CREAT SERPL: 29 (ref 7–25)
CALCIUM SERPL-MCNC: 9.5 MG/DL (ref 8.4–10.2)
CHLORIDE SERPL-SCNC: 104 MMOL/L (ref 98–107)
CHOLEST SERPL-MCNC: 214 MG/DL
CHOLEST/HDLC SERPL: 3.1 {RATIO}
CO2 SERPL-SCNC: 29 MMOL/L (ref 21–32)
CREAT SERPL-MCNC: 0.66 MG/DL (ref 0.51–0.95)
FASTING DURATION TIME PATIENT: ABNORMAL H
FASTING DURATION TIME PATIENT: ABNORMAL H
GFR SERPLBLD BASED ON 1.73 SQ M-ARVRAT: >90 ML/MIN/1.73M2
GLOBULIN SER-MCNC: 2.5 G/DL (ref 2–4)
GLUCOSE SERPL-MCNC: 82 MG/DL (ref 65–99)
HBA1C MFR BLD: 5.4 % (ref 4.5–5.6)
HCT VFR BLD CALC: 43.4 % (ref 36–46.5)
HDLC SERPL-MCNC: 70 MG/DL
HGB BLD-MCNC: 14 G/DL (ref 12–15.5)
LDLC SERPL CALC-MCNC: 129 MG/DL
NONHDLC SERPL-MCNC: 144 MG/DL
POTASSIUM SERPL-SCNC: 3.9 MMOL/L (ref 3.4–5.1)
PROT SERPL-MCNC: 6.7 G/DL (ref 6.4–8.2)
SODIUM SERPL-SCNC: 139 MMOL/L (ref 135–145)
TRIGL SERPL-MCNC: 77 MG/DL

## 2021-02-01 PROCEDURE — 85014 HEMATOCRIT: CPT | Performed by: CLINICAL MEDICAL LABORATORY

## 2021-02-01 PROCEDURE — 80061 LIPID PANEL: CPT | Performed by: CLINICAL MEDICAL LABORATORY

## 2021-02-01 PROCEDURE — 85018 HEMOGLOBIN: CPT | Performed by: CLINICAL MEDICAL LABORATORY

## 2021-02-01 PROCEDURE — 80053 COMPREHEN METABOLIC PANEL: CPT | Performed by: CLINICAL MEDICAL LABORATORY

## 2021-02-01 PROCEDURE — 83036 HEMOGLOBIN GLYCOSYLATED A1C: CPT | Performed by: CLINICAL MEDICAL LABORATORY

## 2021-02-01 RX ORDER — OMEPRAZOLE 40 MG/1
CAPSULE, DELAYED RELEASE ORAL
Qty: 90 CAPSULE | Refills: 3 | OUTPATIENT
Start: 2021-02-01

## 2021-02-11 ENCOUNTER — HOSPITAL ENCOUNTER (OUTPATIENT)
Dept: ULTRASOUND IMAGING | Age: 66
Discharge: HOME | End: 2021-02-11
Attending: INTERNAL MEDICINE

## 2021-02-11 DIAGNOSIS — R92.30 DENSE BREASTS: ICD-10-CM

## 2021-02-11 DIAGNOSIS — Z12.39 ENCOUNTER FOR OTHER SCREENING FOR MALIGNANT NEOPLASM OF BREAST: ICD-10-CM

## 2021-02-11 DIAGNOSIS — R92.2 DENSE BREASTS: ICD-10-CM

## 2021-02-11 PROCEDURE — 76641 ULTRASOUND BREAST COMPLETE: CPT

## 2021-02-24 ENCOUNTER — APPOINTMENT (OUTPATIENT)
Dept: NEUROLOGY | Age: 66
End: 2021-02-24

## 2021-02-25 DIAGNOSIS — M79.7 FIBROMYALGIA: ICD-10-CM

## 2021-02-26 DIAGNOSIS — Z00.6 EXAMINATION OF PARTICIPANT OR CONTROL IN CLINICAL RESEARCH: Primary | ICD-10-CM

## 2021-03-02 ENCOUNTER — TELEPHONE (OUTPATIENT)
Dept: INTERNAL MEDICINE | Age: 66
End: 2021-03-02

## 2021-03-02 RX ORDER — AMITRIPTYLINE HYDROCHLORIDE 10 MG/1
TABLET, FILM COATED ORAL
Qty: 270 TABLET | Refills: 0 | Status: SHIPPED | OUTPATIENT
Start: 2021-03-02 | End: 2021-06-02

## 2021-03-02 RX ORDER — PAROXETINE HYDROCHLORIDE 20 MG/1
TABLET, FILM COATED ORAL
Qty: 90 TABLET | Refills: 0 | Status: SHIPPED | OUTPATIENT
Start: 2021-03-02 | End: 2021-06-02

## 2021-03-04 ENCOUNTER — TELEPHONE (OUTPATIENT)
Dept: GASTROENTEROLOGY | Age: 66
End: 2021-03-04

## 2021-03-05 RX ORDER — OMEPRAZOLE 40 MG/1
40 CAPSULE, DELAYED RELEASE ORAL DAILY
Qty: 30 CAPSULE | Refills: 1 | Status: SHIPPED | OUTPATIENT
Start: 2021-03-05 | End: 2021-06-02 | Stop reason: ALTCHOICE

## 2021-03-16 ENCOUNTER — APPOINTMENT (OUTPATIENT)
Dept: MRI IMAGING | Age: 66
End: 2021-03-16
Attending: PSYCHIATRY & NEUROLOGY

## 2021-03-17 ENCOUNTER — RESEARCH ENCOUNTER (OUTPATIENT)
Dept: NEUROLOGY | Age: 66
End: 2021-03-17

## 2021-03-17 ENCOUNTER — APPOINTMENT (OUTPATIENT)
Dept: PET IMAGING | Age: 66
End: 2021-03-17
Attending: PSYCHIATRY & NEUROLOGY

## 2021-03-17 DIAGNOSIS — F40.240 CLAUSTROPHOBIA: Primary | ICD-10-CM

## 2021-03-17 RX ORDER — COLCHICINE 0.6 MG/1
TABLET ORAL
COMMUNITY
Start: 2021-03-16 | End: 2021-06-02 | Stop reason: ALTCHOICE

## 2021-03-17 RX ORDER — KETOCONAZOLE 20 MG/ML
SHAMPOO TOPICAL
COMMUNITY
Start: 2020-12-10 | End: 2021-09-21 | Stop reason: ALTCHOICE

## 2021-03-17 RX ORDER — LORAZEPAM 0.5 MG/1
TABLET ORAL
Qty: 2 TABLET | Refills: 0 | Status: SHIPPED | OUTPATIENT
Start: 2021-03-17 | End: 2021-03-24 | Stop reason: SDUPTHER

## 2021-03-17 RX ORDER — PREDNISONE 10 MG/1
TABLET ORAL
COMMUNITY
Start: 2021-03-16 | End: 2021-09-21 | Stop reason: ALTCHOICE

## 2021-03-18 ENCOUNTER — APPOINTMENT (OUTPATIENT)
Dept: NEUROLOGY | Age: 66
End: 2021-03-18

## 2021-03-18 ENCOUNTER — HOSPITAL ENCOUNTER (OUTPATIENT)
Dept: MRI IMAGING | Age: 66
Discharge: HOME | End: 2021-03-18
Attending: PSYCHIATRY & NEUROLOGY

## 2021-03-18 ENCOUNTER — APPOINTMENT (OUTPATIENT)
Dept: PET IMAGING | Age: 66
End: 2021-03-18
Attending: PSYCHIATRY & NEUROLOGY

## 2021-03-18 DIAGNOSIS — Z00.6 EXAMINATION OF PARTICIPANT OR CONTROL IN CLINICAL RESEARCH: ICD-10-CM

## 2021-03-18 PROCEDURE — 70551 MRI BRAIN STEM W/O DYE: CPT

## 2021-03-23 ENCOUNTER — APPOINTMENT (OUTPATIENT)
Dept: GASTROENTEROLOGY | Age: 66
End: 2021-03-23

## 2021-03-24 ENCOUNTER — APPOINTMENT (OUTPATIENT)
Dept: PET IMAGING | Age: 66
End: 2021-03-24
Attending: PSYCHIATRY & NEUROLOGY

## 2021-03-24 ENCOUNTER — RESEARCH ENCOUNTER (OUTPATIENT)
Dept: NEUROLOGY | Age: 66
End: 2021-03-24

## 2021-03-24 ENCOUNTER — APPOINTMENT (OUTPATIENT)
Dept: MRI IMAGING | Age: 66
End: 2021-03-24
Attending: PSYCHIATRY & NEUROLOGY

## 2021-03-24 DIAGNOSIS — Z00.6 EXAMINATION OF PARTICIPANT OR CONTROL IN CLINICAL RESEARCH: Primary | ICD-10-CM

## 2021-03-24 DIAGNOSIS — F40.240 CLAUSTROPHOBIA: ICD-10-CM

## 2021-03-24 RX ORDER — LORAZEPAM 0.5 MG/1
TABLET ORAL
Qty: 2 TABLET | Refills: 0 | Status: SHIPPED | OUTPATIENT
Start: 2021-03-24 | End: 2021-09-01 | Stop reason: ALTCHOICE

## 2021-03-25 ENCOUNTER — APPOINTMENT (OUTPATIENT)
Dept: PET IMAGING | Age: 66
End: 2021-03-25
Attending: PSYCHIATRY & NEUROLOGY

## 2021-04-07 ENCOUNTER — APPOINTMENT (OUTPATIENT)
Dept: PET IMAGING | Age: 66
End: 2021-04-07
Attending: PSYCHIATRY & NEUROLOGY

## 2021-04-08 ENCOUNTER — APPOINTMENT (OUTPATIENT)
Dept: PET IMAGING | Age: 66
End: 2021-04-08
Attending: PSYCHIATRY & NEUROLOGY

## 2021-04-20 ENCOUNTER — HOSPITAL ENCOUNTER (OUTPATIENT)
Dept: PET IMAGING | Age: 66
Discharge: HOME | End: 2021-04-20
Attending: PSYCHIATRY & NEUROLOGY

## 2021-04-20 ENCOUNTER — APPOINTMENT (OUTPATIENT)
Dept: NEUROLOGY | Age: 66
End: 2021-04-20

## 2021-04-20 DIAGNOSIS — Z00.6 EXAMINATION OF PARTICIPANT OR CONTROL IN CLINICAL RESEARCH: ICD-10-CM

## 2021-04-20 PROCEDURE — A9552 F18 FDG: HCPCS | Performed by: PSYCHIATRY & NEUROLOGY

## 2021-04-20 PROCEDURE — 78608 BRAIN IMAGING (PET): CPT

## 2021-04-20 PROCEDURE — 10006150 HB RX 343: Performed by: PSYCHIATRY & NEUROLOGY

## 2021-04-20 RX ORDER — FLUDEOXYGLUCOSE F-18 300 MCI/ML
5.4 INJECTION INTRAVENOUS ONCE
Status: COMPLETED | OUTPATIENT
Start: 2021-04-20 | End: 2021-04-20

## 2021-04-20 RX ADMIN — FLUDEOXYGLUCOSE F-18 5.4 MILLICURIE: 300 INJECTION INTRAVENOUS at 15:02

## 2021-04-21 ENCOUNTER — APPOINTMENT (OUTPATIENT)
Dept: NEUROLOGY | Age: 66
End: 2021-04-21

## 2021-04-21 ENCOUNTER — HOSPITAL ENCOUNTER (OUTPATIENT)
Dept: PET IMAGING | Age: 66
Discharge: HOME | End: 2021-04-21
Attending: PSYCHIATRY & NEUROLOGY

## 2021-04-21 DIAGNOSIS — Z00.6 EXAMINATION OF PARTICIPANT OR CONTROL IN CLINICAL RESEARCH: ICD-10-CM

## 2021-04-21 PROCEDURE — 78608 BRAIN IMAGING (PET): CPT

## 2021-04-21 PROCEDURE — 10006150 HB RX 343: Performed by: PSYCHIATRY & NEUROLOGY

## 2021-04-21 RX ADMIN — FLORBETABEN F 18 8.7 MILLICURIE: 135 INJECTION, SOLUTION INTRAVENOUS at 14:30

## 2021-05-13 ENCOUNTER — OFFICE VISIT (OUTPATIENT)
Dept: GASTROENTEROLOGY | Age: 66
End: 2021-05-13

## 2021-05-13 VITALS
WEIGHT: 133 LBS | HEART RATE: 70 BPM | BODY MASS INDEX: 22.16 KG/M2 | HEIGHT: 65 IN | DIASTOLIC BLOOD PRESSURE: 86 MMHG | SYSTOLIC BLOOD PRESSURE: 132 MMHG

## 2021-05-13 DIAGNOSIS — M06.9 RHEUMATOID ARTHRITIS, INVOLVING UNSPECIFIED SITE, UNSPECIFIED WHETHER RHEUMATOID FACTOR PRESENT (CMD): ICD-10-CM

## 2021-05-13 DIAGNOSIS — M85.89 OSTEOPENIA OF MULTIPLE SITES: ICD-10-CM

## 2021-05-13 DIAGNOSIS — K21.9 GASTROESOPHAGEAL REFLUX DISEASE WITHOUT ESOPHAGITIS: Primary | ICD-10-CM

## 2021-05-13 PROCEDURE — 99214 OFFICE O/P EST MOD 30 MIN: CPT | Performed by: INTERNAL MEDICINE

## 2021-05-13 RX ORDER — FAMOTIDINE 20 MG/1
20 TABLET, FILM COATED ORAL 2 TIMES DAILY
Qty: 180 TABLET | Refills: 3 | Status: SHIPPED | OUTPATIENT
Start: 2021-05-13 | End: 2022-01-11 | Stop reason: ALTCHOICE

## 2021-05-26 DIAGNOSIS — M79.7 FIBROMYALGIA: ICD-10-CM

## 2021-05-30 ENCOUNTER — APPOINTMENT (OUTPATIENT)
Dept: CT IMAGING | Age: 66
End: 2021-05-30
Attending: STUDENT IN AN ORGANIZED HEALTH CARE EDUCATION/TRAINING PROGRAM

## 2021-05-30 ENCOUNTER — APPOINTMENT (OUTPATIENT)
Dept: GENERAL RADIOLOGY | Age: 66
End: 2021-05-30
Attending: EMERGENCY MEDICINE

## 2021-05-30 ENCOUNTER — APPOINTMENT (OUTPATIENT)
Dept: MRI IMAGING | Age: 66
End: 2021-05-30
Attending: STUDENT IN AN ORGANIZED HEALTH CARE EDUCATION/TRAINING PROGRAM

## 2021-05-30 ENCOUNTER — HOSPITAL ENCOUNTER (OUTPATIENT)
Age: 66
Setting detail: OBSERVATION
Discharge: HOME | End: 2021-05-31
Attending: EMERGENCY MEDICINE | Admitting: INTERNAL MEDICINE

## 2021-05-30 ENCOUNTER — APPOINTMENT (OUTPATIENT)
Dept: GENERAL RADIOLOGY | Age: 66
End: 2021-05-30
Attending: STUDENT IN AN ORGANIZED HEALTH CARE EDUCATION/TRAINING PROGRAM

## 2021-05-30 DIAGNOSIS — R20.2 PARESTHESIA: Primary | ICD-10-CM

## 2021-05-30 LAB
ALBUMIN SERPL-MCNC: 3.8 G/DL (ref 3.6–5.1)
ALBUMIN/GLOB SERPL: 1.1 {RATIO} (ref 1–2.4)
ALP SERPL-CCNC: 85 UNITS/L (ref 45–117)
ALT SERPL-CCNC: 36 UNITS/L
ANION GAP SERPL CALC-SCNC: 7 MMOL/L (ref 10–20)
AST SERPL-CCNC: 26 UNITS/L
BASOPHILS # BLD: 0 K/MCL (ref 0–0.3)
BASOPHILS NFR BLD: 0 %
BILIRUB SERPL-MCNC: 0.3 MG/DL (ref 0.2–1)
BUN SERPL-MCNC: 21 MG/DL (ref 6–20)
BUN/CREAT SERPL: 28 (ref 7–25)
CALCIUM SERPL-MCNC: 9 MG/DL (ref 8.4–10.2)
CHLORIDE SERPL-SCNC: 105 MMOL/L (ref 98–107)
CO2 SERPL-SCNC: 31 MMOL/L (ref 21–32)
CREAT SERPL-MCNC: 0.75 MG/DL (ref 0.51–0.95)
DEPRECATED RDW RBC: 46.4 FL (ref 39–50)
EOSINOPHIL # BLD: 0.1 K/MCL (ref 0–0.5)
EOSINOPHIL NFR BLD: 1 %
ERYTHROCYTE [DISTWIDTH] IN BLOOD: 13.6 % (ref 11–15)
FASTING DURATION TIME PATIENT: ABNORMAL H
GFR SERPLBLD BASED ON 1.73 SQ M-ARVRAT: 83 ML/MIN/1.73M2
GLOBULIN SER-MCNC: 3.6 G/DL (ref 2–4)
GLUCOSE SERPL-MCNC: 137 MG/DL (ref 65–99)
HCT VFR BLD CALC: 40.1 % (ref 36–46.5)
HGB BLD-MCNC: 14 G/DL (ref 12–15.5)
IMM GRANULOCYTES # BLD AUTO: 0 K/MCL (ref 0–0.2)
IMM GRANULOCYTES # BLD: 0 %
LYMPHOCYTES # BLD: 1 K/MCL (ref 1–4)
LYMPHOCYTES NFR BLD: 11 %
MAGNESIUM SERPL-MCNC: 2.1 MG/DL (ref 1.7–2.4)
MCH RBC QN AUTO: 32.9 PG (ref 26–34)
MCHC RBC AUTO-ENTMCNC: 34.9 G/DL (ref 32–36.5)
MCV RBC AUTO: 94.4 FL (ref 78–100)
MONOCYTES # BLD: 0.6 K/MCL (ref 0.3–0.9)
MONOCYTES NFR BLD: 6 %
NEUTROPHILS # BLD: 7.8 K/MCL (ref 1.8–7.7)
NEUTROPHILS NFR BLD: 82 %
NRBC BLD MANUAL-RTO: 0 /100 WBC
PHOSPHATE SERPL-MCNC: 3.4 MG/DL (ref 2.4–4.7)
PLATELET # BLD AUTO: 273 K/MCL (ref 140–450)
POTASSIUM SERPL-SCNC: 3.4 MMOL/L (ref 3.4–5.1)
PROT SERPL-MCNC: 7.4 G/DL (ref 6.4–8.2)
RBC # BLD: 4.25 MIL/MCL (ref 4–5.2)
SARS-COV-2 RNA RESP QL NAA+PROBE: NOT DETECTED
SERVICE CMNT-IMP: NORMAL
SERVICE CMNT-IMP: NORMAL
SODIUM SERPL-SCNC: 140 MMOL/L (ref 135–145)
TSH SERPL-ACNC: 0.97 MCUNITS/ML (ref 0.35–5)
WBC # BLD: 9.5 K/MCL (ref 4.2–11)

## 2021-05-30 PROCEDURE — 70549 MR ANGIOGRAPH NECK W/O&W/DYE: CPT

## 2021-05-30 PROCEDURE — 10002805 HB CONTRAST AGENT: Performed by: STUDENT IN AN ORGANIZED HEALTH CARE EDUCATION/TRAINING PROGRAM

## 2021-05-30 PROCEDURE — 71045 X-RAY EXAM CHEST 1 VIEW: CPT

## 2021-05-30 PROCEDURE — 83735 ASSAY OF MAGNESIUM: CPT | Performed by: STUDENT IN AN ORGANIZED HEALTH CARE EDUCATION/TRAINING PROGRAM

## 2021-05-30 PROCEDURE — 99220 INITIAL OBSERVATION CARE,LEVL III: CPT | Performed by: INTERNAL MEDICINE

## 2021-05-30 PROCEDURE — 10002803 HB RX 637: Performed by: STUDENT IN AN ORGANIZED HEALTH CARE EDUCATION/TRAINING PROGRAM

## 2021-05-30 PROCEDURE — 87635 SARS-COV-2 COVID-19 AMP PRB: CPT | Performed by: STUDENT IN AN ORGANIZED HEALTH CARE EDUCATION/TRAINING PROGRAM

## 2021-05-30 PROCEDURE — 36415 COLL VENOUS BLD VENIPUNCTURE: CPT

## 2021-05-30 PROCEDURE — 99285 EMERGENCY DEPT VISIT HI MDM: CPT

## 2021-05-30 PROCEDURE — 80053 COMPREHEN METABOLIC PANEL: CPT | Performed by: STUDENT IN AN ORGANIZED HEALTH CARE EDUCATION/TRAINING PROGRAM

## 2021-05-30 PROCEDURE — 85025 COMPLETE CBC W/AUTO DIFF WBC: CPT | Performed by: STUDENT IN AN ORGANIZED HEALTH CARE EDUCATION/TRAINING PROGRAM

## 2021-05-30 PROCEDURE — 82607 VITAMIN B-12: CPT | Performed by: INTERNAL MEDICINE

## 2021-05-30 PROCEDURE — 84443 ASSAY THYROID STIM HORMONE: CPT | Performed by: STUDENT IN AN ORGANIZED HEALTH CARE EDUCATION/TRAINING PROGRAM

## 2021-05-30 PROCEDURE — A9585 GADOBUTROL INJECTION: HCPCS | Performed by: STUDENT IN AN ORGANIZED HEALTH CARE EDUCATION/TRAINING PROGRAM

## 2021-05-30 PROCEDURE — G0378 HOSPITAL OBSERVATION PER HR: HCPCS

## 2021-05-30 PROCEDURE — 70544 MR ANGIOGRAPHY HEAD W/O DYE: CPT

## 2021-05-30 PROCEDURE — 70553 MRI BRAIN STEM W/O & W/DYE: CPT

## 2021-05-30 PROCEDURE — 99285 EMERGENCY DEPT VISIT HI MDM: CPT | Performed by: STUDENT IN AN ORGANIZED HEALTH CARE EDUCATION/TRAINING PROGRAM

## 2021-05-30 PROCEDURE — 70450 CT HEAD/BRAIN W/O DYE: CPT

## 2021-05-30 PROCEDURE — 84100 ASSAY OF PHOSPHORUS: CPT | Performed by: STUDENT IN AN ORGANIZED HEALTH CARE EDUCATION/TRAINING PROGRAM

## 2021-05-30 PROCEDURE — 10004651 HB RX, NO CHARGE ITEM: Performed by: STUDENT IN AN ORGANIZED HEALTH CARE EDUCATION/TRAINING PROGRAM

## 2021-05-30 RX ORDER — GADOBUTROL 604.72 MG/ML
6 INJECTION INTRAVENOUS ONCE
Status: COMPLETED | OUTPATIENT
Start: 2021-05-30 | End: 2021-05-30

## 2021-05-30 RX ORDER — LORAZEPAM 1 MG/1
0.5 TABLET ORAL ONCE
Status: COMPLETED | OUTPATIENT
Start: 2021-05-30 | End: 2021-05-30

## 2021-05-30 RX ORDER — ASPIRIN 325 MG
325 TABLET ORAL ONCE
Status: COMPLETED | OUTPATIENT
Start: 2021-05-30 | End: 2021-05-30

## 2021-05-30 RX ADMIN — GADOBUTROL 6 ML: 604.72 INJECTION INTRAVENOUS at 23:18

## 2021-05-30 RX ADMIN — ASPIRIN 325 MG: 325 TABLET ORAL at 23:19

## 2021-05-30 RX ADMIN — LORAZEPAM 0.5 MG: 1 TABLET ORAL at 21:40

## 2021-05-30 ASSESSMENT — ENCOUNTER SYMPTOMS
WHEEZING: 0
WEAKNESS: 0
VOMITING: 0
CHILLS: 0
DIZZINESS: 0
SHORTNESS OF BREATH: 0
EYE PAIN: 0
BACK PAIN: 0
COUGH: 0
SEIZURES: 0
DOUBLE VISION: 0
FEVER: 0
NAUSEA: 0
ABDOMINAL PAIN: 0
BLURRED VISION: 0
WEIGHT LOSS: 0

## 2021-05-30 ASSESSMENT — PAIN SCALES - GENERAL
PAINLEVEL_OUTOF10: 1
PAINLEVEL_OUTOF10: 1

## 2021-05-30 ASSESSMENT — PAIN DESCRIPTION - PAIN TYPE: TYPE: ACUTE PAIN

## 2021-05-31 VITALS
WEIGHT: 132.5 LBS | BODY MASS INDEX: 22.08 KG/M2 | OXYGEN SATURATION: 96 % | TEMPERATURE: 96.8 F | HEART RATE: 79 BPM | DIASTOLIC BLOOD PRESSURE: 75 MMHG | SYSTOLIC BLOOD PRESSURE: 131 MMHG | RESPIRATION RATE: 18 BRPM | HEIGHT: 65 IN

## 2021-05-31 LAB
ALBUMIN SERPL-MCNC: 3.4 G/DL (ref 3.6–5.1)
ALBUMIN/GLOB SERPL: 1.2 {RATIO} (ref 1–2.4)
ALP SERPL-CCNC: 69 UNITS/L (ref 45–117)
ALT SERPL-CCNC: 32 UNITS/L
ANION GAP SERPL CALC-SCNC: 6 MMOL/L (ref 10–20)
AST SERPL-CCNC: 16 UNITS/L
BASOPHILS # BLD: 0 K/MCL (ref 0–0.3)
BASOPHILS NFR BLD: 1 %
BILIRUB SERPL-MCNC: 0.3 MG/DL (ref 0.2–1)
BUN SERPL-MCNC: 22 MG/DL (ref 6–20)
BUN/CREAT SERPL: 30 (ref 7–25)
CALCIUM SERPL-MCNC: 9.3 MG/DL (ref 8.4–10.2)
CHLORIDE SERPL-SCNC: 107 MMOL/L (ref 98–107)
CO2 SERPL-SCNC: 31 MMOL/L (ref 21–32)
CREAT SERPL-MCNC: 0.73 MG/DL (ref 0.51–0.95)
DEPRECATED RDW RBC: 48.7 FL (ref 39–50)
EOSINOPHIL # BLD: 0.2 K/MCL (ref 0–0.5)
EOSINOPHIL NFR BLD: 3 %
ERYTHROCYTE [DISTWIDTH] IN BLOOD: 13.9 % (ref 11–15)
FASTING DURATION TIME PATIENT: ABNORMAL H
GFR SERPLBLD BASED ON 1.73 SQ M-ARVRAT: 85 ML/MIN/1.73M2
GLOBULIN SER-MCNC: 2.8 G/DL (ref 2–4)
GLUCOSE SERPL-MCNC: 65 MG/DL (ref 65–99)
HCT VFR BLD CALC: 38.2 % (ref 36–46.5)
HGB BLD-MCNC: 12.9 G/DL (ref 12–15.5)
IMM GRANULOCYTES # BLD AUTO: 0 K/MCL (ref 0–0.2)
IMM GRANULOCYTES # BLD: 0 %
LYMPHOCYTES # BLD: 1.6 K/MCL (ref 1–4)
LYMPHOCYTES NFR BLD: 26 %
MAGNESIUM SERPL-MCNC: 2 MG/DL (ref 1.7–2.4)
MCH RBC QN AUTO: 32.4 PG (ref 26–34)
MCHC RBC AUTO-ENTMCNC: 33.8 G/DL (ref 32–36.5)
MCV RBC AUTO: 96 FL (ref 78–100)
MONOCYTES # BLD: 0.7 K/MCL (ref 0.3–0.9)
MONOCYTES NFR BLD: 12 %
NEUTROPHILS # BLD: 3.5 K/MCL (ref 1.8–7.7)
NEUTROPHILS NFR BLD: 58 %
NRBC BLD MANUAL-RTO: 0 /100 WBC
PLATELET # BLD AUTO: 235 K/MCL (ref 140–450)
POTASSIUM SERPL-SCNC: 3.5 MMOL/L (ref 3.4–5.1)
PROT SERPL-MCNC: 6.2 G/DL (ref 6.4–8.2)
RAINBOW EXTRA TUBES HOLD SPECIMEN: NORMAL
RBC # BLD: 3.98 MIL/MCL (ref 4–5.2)
SODIUM SERPL-SCNC: 140 MMOL/L (ref 135–145)
VIT B12 SERPL-MCNC: 349 PG/ML (ref 211–911)
WBC # BLD: 6 K/MCL (ref 4.2–11)

## 2021-05-31 PROCEDURE — G0378 HOSPITAL OBSERVATION PER HR: HCPCS

## 2021-05-31 PROCEDURE — 10004651 HB RX, NO CHARGE ITEM: Performed by: INTERNAL MEDICINE

## 2021-05-31 PROCEDURE — 85025 COMPLETE CBC W/AUTO DIFF WBC: CPT | Performed by: INTERNAL MEDICINE

## 2021-05-31 PROCEDURE — 10002803 HB RX 637: Performed by: HOSPITALIST

## 2021-05-31 PROCEDURE — 80053 COMPREHEN METABOLIC PANEL: CPT | Performed by: INTERNAL MEDICINE

## 2021-05-31 PROCEDURE — 36415 COLL VENOUS BLD VENIPUNCTURE: CPT | Performed by: INTERNAL MEDICINE

## 2021-05-31 PROCEDURE — 10002803 HB RX 637: Performed by: INTERNAL MEDICINE

## 2021-05-31 PROCEDURE — 83735 ASSAY OF MAGNESIUM: CPT | Performed by: INTERNAL MEDICINE

## 2021-05-31 PROCEDURE — 99205 OFFICE O/P NEW HI 60 MIN: CPT | Performed by: PSYCHIATRY & NEUROLOGY

## 2021-05-31 PROCEDURE — 99217 OBSERVATION CARE DISCHARGE: CPT | Performed by: HOSPITALIST

## 2021-05-31 RX ORDER — FOLIC ACID 1 MG/1
1 TABLET ORAL DAILY
Status: DISCONTINUED | OUTPATIENT
Start: 2021-05-31 | End: 2021-05-31 | Stop reason: HOSPADM

## 2021-05-31 RX ORDER — MOXIFLOXACIN HYDROCHLORIDE 400 MG/1
400 TABLET ORAL DAILY
Status: DISCONTINUED | OUTPATIENT
Start: 2021-05-31 | End: 2021-05-31 | Stop reason: HOSPADM

## 2021-05-31 RX ORDER — AMITRIPTYLINE HYDROCHLORIDE 10 MG/1
30 TABLET, FILM COATED ORAL NIGHTLY
Status: DISCONTINUED | OUTPATIENT
Start: 2021-05-31 | End: 2021-05-31

## 2021-05-31 RX ORDER — PANTOPRAZOLE SODIUM 40 MG/1
40 TABLET, DELAYED RELEASE ORAL
Refills: 1 | Status: DISCONTINUED | OUTPATIENT
Start: 2021-05-31 | End: 2021-05-31 | Stop reason: HOSPADM

## 2021-05-31 RX ORDER — AMITRIPTYLINE HYDROCHLORIDE 10 MG/1
30 TABLET, FILM COATED ORAL NIGHTLY
Status: DISCONTINUED | OUTPATIENT
Start: 2021-05-31 | End: 2021-05-31 | Stop reason: HOSPADM

## 2021-05-31 RX ORDER — FAMOTIDINE 20 MG/1
20 TABLET, FILM COATED ORAL 2 TIMES DAILY
Status: DISCONTINUED | OUTPATIENT
Start: 2021-05-31 | End: 2021-05-31

## 2021-05-31 RX ORDER — PAROXETINE HYDROCHLORIDE 20 MG/1
20 TABLET, FILM COATED ORAL DAILY
Status: DISCONTINUED | OUTPATIENT
Start: 2021-05-31 | End: 2021-05-31 | Stop reason: HOSPADM

## 2021-05-31 RX ORDER — 0.9 % SODIUM CHLORIDE 0.9 %
2 VIAL (ML) INJECTION EVERY 12 HOURS SCHEDULED
Status: DISCONTINUED | OUTPATIENT
Start: 2021-05-31 | End: 2021-05-31

## 2021-05-31 RX ADMIN — PANTOPRAZOLE SODIUM 40 MG: 40 TABLET, DELAYED RELEASE ORAL at 06:22

## 2021-05-31 RX ADMIN — PAROXETINE 20 MG: 20 TABLET, FILM COATED ORAL at 09:32

## 2021-05-31 RX ADMIN — FOLIC ACID 1 MG: 1 TABLET ORAL at 09:30

## 2021-05-31 RX ADMIN — MOXIFLOXACIN HYDROCHLORIDE 400 MG: 400 TABLET, FILM COATED ORAL at 09:30

## 2021-05-31 RX ADMIN — SODIUM CHLORIDE, PRESERVATIVE FREE 2 ML: 5 INJECTION INTRAVENOUS at 09:30

## 2021-05-31 RX ADMIN — AMITRIPTYLINE HYDROCHLORIDE 30 MG: 10 TABLET, FILM COATED ORAL at 01:08

## 2021-05-31 ASSESSMENT — COGNITIVE AND FUNCTIONAL STATUS - GENERAL
DO YOU HAVE SERIOUS DIFFICULTY WALKING OR CLIMBING STAIRS: NO
ARE YOU BLIND OR DO YOU HAVE SERIOUS DIFFICULTY SEEING, EVEN WHEN WEARING GLASSES: NO
BECAUSE OF A PHYSICAL, MENTAL, OR EMOTIONAL CONDITION, DO YOU HAVE DIFFICULTY DOING ERRANDS ALONE: NO
BECAUSE OF A PHYSICAL, MENTAL, OR EMOTIONAL CONDITION, DO YOU HAVE SERIOUS DIFFICULTY CONCENTRATING, REMEMBERING OR MAKING DECISIONS: NO
DO YOU HAVE DIFFICULTY DRESSING OR BATHING: NO
ARE YOU DEAF OR DO YOU HAVE SERIOUS DIFFICULTY  HEARING: NO

## 2021-05-31 ASSESSMENT — ACTIVITIES OF DAILY LIVING (ADL)
RECENT_DECLINE_ADL: NO
CHRONIC_PAIN_PRESENT: NO
ADL_BEFORE_ADMISSION: INDEPENDENT
ADL_SCORE: 12
ADL_SHORT_OF_BREATH: NO

## 2021-05-31 ASSESSMENT — PAIN SCALES - GENERAL
PAINLEVEL_OUTOF10: 0
PAINLEVEL_OUTOF10: 0

## 2021-05-31 ASSESSMENT — MOVEMENT AND STRENGTH ASSESSMENTS
RUE_ASSESSMENT: NORMAL/COMPLETE ROM AGAINST GRAVITY WITH FULL RESISTANCE
LUE_ASSESSMENT: NORMAL/COMPLETE ROM AGAINST GRAVITY WITH FULL RESISTANCE
LLE_ASSESSMENT: NORMAL/COMPLETE ROM AGAINST GRAVITY WITH FULL RESISTANCE
RLE_ASSESSMENT: NORMAL/COMPLETE ROM AGAINST GRAVITY WITH FULL RESISTANCE

## 2021-05-31 ASSESSMENT — ORIENTATION MEMORY CONCENTRATION TEST (OMCT)
REPEAT THE NAME AND ADDRESS I ASKED YOU TO REMEMBER: CORRECT
COUNT BACKWARDS FROM 20 TO 1: CORRECT
WHAT TIME IS IT (NO WATCH OR CLOCK): CORRECT
WHAT MONTH IS IT NOW: CORRECT
SAY THE MONTHS IN REVERSE ORDER STARTING WITH LAST MONTH: CORRECT
WHAT YEAR IS IT NOW (MUST BE EXACT): CORRECT
OMCT INTERPRETATION: 0-6: NO SIGNIFICANT IMPAIRMENT
OMCT SCORE: 0

## 2021-05-31 ASSESSMENT — LIFESTYLE VARIABLES
AUDIT-C TOTAL SCORE: 0
HOW OFTEN DO YOU HAVE A DRINK CONTAINING ALCOHOL: NEVER
HOW OFTEN DO YOU HAVE 6 OR MORE DRINKS ON ONE OCCASION: NEVER
HOW MANY STANDARD DRINKS CONTAINING ALCOHOL DO YOU HAVE ON A TYPICAL DAY: 0,1 OR 2
ALCOHOL_USE_STATUS: NO OR LOW RISK WITH VALIDATED TOOL

## 2021-06-01 ENCOUNTER — TELEPHONE (OUTPATIENT)
Dept: SCHEDULING | Age: 66
End: 2021-06-01

## 2021-06-02 ENCOUNTER — TELEPHONE (OUTPATIENT)
Dept: SCHEDULING | Age: 66
End: 2021-06-02

## 2021-06-02 ENCOUNTER — OFFICE VISIT (OUTPATIENT)
Dept: INTERNAL MEDICINE | Age: 66
End: 2021-06-02

## 2021-06-02 VITALS
HEIGHT: 65 IN | SYSTOLIC BLOOD PRESSURE: 136 MMHG | TEMPERATURE: 97 F | OXYGEN SATURATION: 100 % | HEART RATE: 78 BPM | WEIGHT: 133 LBS | DIASTOLIC BLOOD PRESSURE: 74 MMHG | BODY MASS INDEX: 22.16 KG/M2 | RESPIRATION RATE: 14 BRPM

## 2021-06-02 DIAGNOSIS — M06.9 RHEUMATOID ARTHRITIS, INVOLVING UNSPECIFIED SITE, UNSPECIFIED WHETHER RHEUMATOID FACTOR PRESENT (CMD): ICD-10-CM

## 2021-06-02 DIAGNOSIS — R20.2 PARESTHESIAS: ICD-10-CM

## 2021-06-02 DIAGNOSIS — Z85.41 HISTORY OF CERVICAL CANCER: Primary | ICD-10-CM

## 2021-06-02 PROCEDURE — 99495 TRANSJ CARE MGMT MOD F2F 14D: CPT | Performed by: INTERNAL MEDICINE

## 2021-06-02 RX ORDER — AMITRIPTYLINE HYDROCHLORIDE 10 MG/1
TABLET, FILM COATED ORAL
Qty: 270 TABLET | Refills: 0 | Status: SHIPPED | OUTPATIENT
Start: 2021-06-02 | End: 2021-08-31

## 2021-06-02 RX ORDER — PAROXETINE HYDROCHLORIDE 20 MG/1
TABLET, FILM COATED ORAL
Qty: 90 TABLET | Refills: 0 | Status: SHIPPED | OUTPATIENT
Start: 2021-06-02 | End: 2021-08-31

## 2021-06-02 ASSESSMENT — PATIENT HEALTH QUESTIONNAIRE - PHQ9
1. LITTLE INTEREST OR PLEASURE IN DOING THINGS: NOT AT ALL
CLINICAL INTERPRETATION OF PHQ2 SCORE: NO FURTHER SCREENING NEEDED
CLINICAL INTERPRETATION OF PHQ9 SCORE: NO FURTHER SCREENING NEEDED
SUM OF ALL RESPONSES TO PHQ9 QUESTIONS 1 AND 2: 0
2. FEELING DOWN, DEPRESSED OR HOPELESS: NOT AT ALL
SUM OF ALL RESPONSES TO PHQ9 QUESTIONS 1 AND 2: 0

## 2021-06-02 ASSESSMENT — PAIN SCALES - GENERAL: PAINLEVEL: 0

## 2021-06-03 ENCOUNTER — APPOINTMENT (OUTPATIENT)
Dept: INTERNAL MEDICINE | Age: 66
End: 2021-06-03

## 2021-08-20 ENCOUNTER — E-ADVICE (OUTPATIENT)
Dept: INTERNAL MEDICINE | Age: 66
End: 2021-08-20

## 2021-08-29 DIAGNOSIS — M79.7 FIBROMYALGIA: ICD-10-CM

## 2021-08-31 ENCOUNTER — TELEPHONE (OUTPATIENT)
Dept: RHEUMATOLOGY | Age: 66
End: 2021-08-31

## 2021-08-31 RX ORDER — PAROXETINE HYDROCHLORIDE 20 MG/1
TABLET, FILM COATED ORAL
Qty: 90 TABLET | Refills: 0 | Status: SHIPPED | OUTPATIENT
Start: 2021-08-31

## 2021-08-31 RX ORDER — AMITRIPTYLINE HYDROCHLORIDE 10 MG/1
TABLET, FILM COATED ORAL
Qty: 270 TABLET | Refills: 0 | Status: SHIPPED | OUTPATIENT
Start: 2021-08-31 | End: 2021-10-29 | Stop reason: SDUPTHER

## 2021-09-01 ENCOUNTER — TELEPHONE (OUTPATIENT)
Dept: NEUROLOGY | Age: 66
End: 2021-09-01

## 2021-09-01 PROBLEM — M65.331 ACQUIRED TRIGGER FINGER OF BOTH MIDDLE FINGERS: Status: RESOLVED | Noted: 2020-09-07 | Resolved: 2021-09-01

## 2021-09-01 PROBLEM — M65.332 ACQUIRED TRIGGER FINGER OF BOTH MIDDLE FINGERS: Status: RESOLVED | Noted: 2020-09-07 | Resolved: 2021-09-01

## 2021-09-02 ENCOUNTER — OFFICE VISIT (OUTPATIENT)
Dept: NEUROLOGY | Age: 66
End: 2021-09-02

## 2021-09-02 VITALS
HEART RATE: 85 BPM | WEIGHT: 131.28 LBS | DIASTOLIC BLOOD PRESSURE: 64 MMHG | SYSTOLIC BLOOD PRESSURE: 126 MMHG | BODY MASS INDEX: 21.87 KG/M2 | HEIGHT: 65 IN

## 2021-09-02 DIAGNOSIS — R25.1 TREMOR OF LEFT HAND: ICD-10-CM

## 2021-09-02 DIAGNOSIS — R20.2 PARESTHESIAS: Primary | ICD-10-CM

## 2021-09-02 PROCEDURE — 99215 OFFICE O/P EST HI 40 MIN: CPT | Performed by: PSYCHIATRY & NEUROLOGY

## 2021-09-09 ENCOUNTER — IMMUNIZATION (OUTPATIENT)
Dept: FAMILY MEDICINE | Age: 66
End: 2021-09-09

## 2021-09-09 DIAGNOSIS — Z23 NEED FOR VACCINATION: Primary | ICD-10-CM

## 2021-09-09 PROCEDURE — 0001A COVID 19 PFIZER-BIONTECH: CPT

## 2021-09-09 PROCEDURE — 91300 COVID 19 PFIZER-BIONTECH: CPT

## 2021-09-13 ENCOUNTER — HOSPITAL ENCOUNTER (OUTPATIENT)
Dept: MAMMOGRAPHY | Age: 66
Discharge: HOME | End: 2021-09-13
Attending: INTERNAL MEDICINE

## 2021-09-13 ENCOUNTER — CASE MANAGEMENT (OUTPATIENT)
Dept: CARE COORDINATION | Age: 66
End: 2021-09-13

## 2021-09-13 DIAGNOSIS — Z12.39 BREAST CANCER SCREENING: ICD-10-CM

## 2021-09-13 DIAGNOSIS — Z12.39 ENCOUNTER FOR OTHER SCREENING FOR MALIGNANT NEOPLASM OF BREAST: Primary | ICD-10-CM

## 2021-09-13 DIAGNOSIS — R92.2 DENSE BREASTS: ICD-10-CM

## 2021-09-13 DIAGNOSIS — R92.30 DENSE BREASTS: ICD-10-CM

## 2021-09-13 PROCEDURE — 77067 SCR MAMMO BI INCL CAD: CPT

## 2021-09-14 ENCOUNTER — CASE MANAGEMENT (OUTPATIENT)
Dept: CARE COORDINATION | Age: 66
End: 2021-09-14

## 2021-09-17 ENCOUNTER — CASE MANAGEMENT (OUTPATIENT)
Dept: CARE COORDINATION | Age: 66
End: 2021-09-17

## 2021-09-21 ENCOUNTER — OFFICE VISIT (OUTPATIENT)
Dept: INTERNAL MEDICINE | Age: 66
End: 2021-09-21

## 2021-09-21 VITALS
RESPIRATION RATE: 14 BRPM | HEART RATE: 98 BPM | HEIGHT: 65 IN | WEIGHT: 130 LBS | BODY MASS INDEX: 21.66 KG/M2 | OXYGEN SATURATION: 97 % | TEMPERATURE: 96.3 F | SYSTOLIC BLOOD PRESSURE: 132 MMHG | DIASTOLIC BLOOD PRESSURE: 68 MMHG

## 2021-09-21 DIAGNOSIS — M79.7 FIBROMYALGIA: ICD-10-CM

## 2021-09-21 DIAGNOSIS — Z23 NEED FOR INFLUENZA VACCINATION: Primary | ICD-10-CM

## 2021-09-21 DIAGNOSIS — Z91.09 ENVIRONMENTAL ALLERGIES: ICD-10-CM

## 2021-09-21 DIAGNOSIS — R53.83 FATIGUE, UNSPECIFIED TYPE: ICD-10-CM

## 2021-09-21 DIAGNOSIS — M85.89 OSTEOPENIA OF MULTIPLE SITES: ICD-10-CM

## 2021-09-21 DIAGNOSIS — M06.9 RHEUMATOID ARTHRITIS, INVOLVING UNSPECIFIED SITE, UNSPECIFIED WHETHER RHEUMATOID FACTOR PRESENT (CMD): ICD-10-CM

## 2021-09-21 DIAGNOSIS — Z00.00 MEDICARE ANNUAL WELLNESS VISIT, INITIAL: ICD-10-CM

## 2021-09-21 DIAGNOSIS — E78.00 PURE HYPERCHOLESTEROLEMIA: ICD-10-CM

## 2021-09-21 DIAGNOSIS — Z23 NEED FOR PNEUMOCOCCAL VACCINATION: ICD-10-CM

## 2021-09-21 DIAGNOSIS — R20.2 PARESTHESIAS: ICD-10-CM

## 2021-09-21 PROCEDURE — G0438 PPPS, INITIAL VISIT: HCPCS | Performed by: INTERNAL MEDICINE

## 2021-09-21 PROCEDURE — 99214 OFFICE O/P EST MOD 30 MIN: CPT | Performed by: INTERNAL MEDICINE

## 2021-09-21 RX ORDER — PREDNISONE 1 MG/1
3 TABLET ORAL DAILY
COMMUNITY
Start: 2021-09-03 | End: 2022-01-11 | Stop reason: ALTCHOICE

## 2021-09-21 RX ORDER — TRIAMCINOLONE ACETONIDE 55 UG/1
2 SPRAY, METERED NASAL DAILY
Qty: 16.5 ML | Refills: 12 | Status: SHIPPED | COMMUNITY
Start: 2021-09-21

## 2021-09-21 ASSESSMENT — ACTIVITIES OF DAILY LIVING (ADL)
GROCERY SHOPPING: INDEPENDENT
RECENT_DECLINE_ADL: NO
PILL BOX USED: NO
USING TRANSPORTATION: INDEPENDENT
ADL_SCORE: 12
NEEDS ASSISTANCE WITH FOOD: INDEPENDENT
STIL DRIVING: NO
RECENT_DECLINE_ADL: NO
ADL_BEFORE_ADMISSION: INDEPENDENT
ADL_BEFORE_ADMISSION: INDEPENDENT
DOING HOUSEWORK: INDEPENDENT
ADL_SHORT_OF_BREATH: NO
NEEDS_ASSIST: NO
PREPARING MEALS: INDEPENDENT
TAKING MEDICATION: INDEPENDENT
MANAGING FINANCES: INDEPENDENT
ADL_SCORE: 12
SENSORY_SUPPORT_DEVICES: EYEGLASSES
ADL_SHORT_OF_BREATH: NO
USING TELEPHONE: INDEPENDENT
EATING: INDEPENDENT

## 2021-09-21 ASSESSMENT — COGNITIVE AND FUNCTIONAL STATUS - GENERAL
ARE YOU BLIND OR DO YOU HAVE SERIOUS DIFFICULTY SEEING, EVEN WHEN WEARING GLASSES: NO
ARE YOU DEAF OR DO YOU HAVE SERIOUS DIFFICULTY  HEARING: NO
DO YOU HAVE DIFFICULTY DRESSING OR BATHING: NO
DO YOU HAVE SERIOUS DIFFICULTY WALKING OR CLIMBING STAIRS: NO
BECAUSE OF A PHYSICAL, MENTAL, OR EMOTIONAL CONDITION, DO YOU HAVE DIFFICULTY DOING ERRANDS ALONE: NO
BECAUSE OF A PHYSICAL, MENTAL, OR EMOTIONAL CONDITION, DO YOU HAVE SERIOUS DIFFICULTY CONCENTRATING, REMEMBERING OR MAKING DECISIONS: NO

## 2021-09-21 ASSESSMENT — PAIN SCALES - PAIN ENJOYMENT GENERAL ACTIVITY SCALE (PEG)
PEG_TOTALSCORE: 0
INTERFERED_GENERAL_ACTIVITY: 0 (DOES NOT INTERFERE)
AVG_PAIN_PASTWEEK: 0 (NO PAIN)
INTERFERED_ENJOYMENT_LIFE: 0 (DOES NOT INTERFERE)

## 2021-09-21 ASSESSMENT — PATIENT HEALTH QUESTIONNAIRE - PHQ9
SUM OF ALL RESPONSES TO PHQ9 QUESTIONS 1 AND 2: 0
1. LITTLE INTEREST OR PLEASURE IN DOING THINGS: NOT AT ALL
2. FEELING DOWN, DEPRESSED OR HOPELESS: NOT AT ALL
SUM OF ALL RESPONSES TO PHQ9 QUESTIONS 1 AND 2: 0
CLINICAL INTERPRETATION OF PHQ9 SCORE: NO FURTHER SCREENING NEEDED
CLINICAL INTERPRETATION OF PHQ2 SCORE: NO FURTHER SCREENING NEEDED

## 2021-09-21 ASSESSMENT — MINI COG
PATIENT ABLE TO REPEAT THE 3 WORDS GIVEN PREVIOUSLY?: WAS ABLE TO REPEAT BACK 3 WORDS CORRECTLY
TOTAL SCORE: 5
PATIENT ABLE TO FILL IN THE CLOCK FACE WITH 10 MINUTES PAST 11 O'CLOCK?: YES, CLOCK IS CORRECT
PATIENT WAS GIVEN REPEAT BACK WORDS FROM VERSION: 2 - LEADER SEASON TABLE

## 2021-09-21 ASSESSMENT — PAIN SCALES - GENERAL: PAINLEVEL: 0

## 2021-09-28 ENCOUNTER — CASE MANAGEMENT (OUTPATIENT)
Dept: CARE COORDINATION | Age: 66
End: 2021-09-28

## 2021-09-28 RX ORDER — MULTIVITAMIN,THER AND MINERALS
1 TABLET ORAL DAILY
COMMUNITY
End: 2022-03-31 | Stop reason: ALTCHOICE

## 2021-09-28 SDOH — HEALTH STABILITY: MENTAL HEALTH: DEPRESSION SCREENING SCORE: 0

## 2021-09-28 SDOH — SOCIAL STABILITY: SOCIAL NETWORK
HOW OFTEN DO YOU SEE OR TALK TO PEOPLE THAT YOU CARE ABOUT AND FEEL CLOSE TO? (FOR EXAMPLE: TALKING TO FRIENDS ON THE PHONE, VISITING FRIENDS OR FAMILY, GOING TO CHURCH OR CLUB MEETINGS): 5 OR MORE TIMES A WEEK

## 2021-09-28 SDOH — HEALTH STABILITY: MENTAL HEALTH: HOW OFTEN DO YOU HAVE A DRINK CONTAINING ALCOHOL?: 2 TO 3 TIMES PER WEEK

## 2021-09-28 SDOH — HEALTH STABILITY: MENTAL HEALTH: HOW MANY STANDARD DRINKS CONTAINING ALCOHOL DO YOU HAVE ON A TYPICAL DAY?: 0,1 OR 2

## 2021-09-28 SDOH — ECONOMIC STABILITY: TRANSPORTATION INSECURITY
IN THE PAST 12 MONTHS, HAS LACK OF TRANSPORTATION KEPT YOU FROM MEETINGS, WORK, OR FROM GETTING THINGS NEEDED FOR DAILY LIVING?: NO

## 2021-09-28 SDOH — HEALTH STABILITY: MENTAL HEALTH: PHQ2 INTERPRETATION: NO FURTHER SCREENING NEEDED

## 2021-09-28 SDOH — HEALTH STABILITY: PHYSICAL HEALTH: ON AVERAGE, HOW MANY MINUTES DO YOU ENGAGE IN EXERCISE AT THIS LEVEL?: 20 MIN

## 2021-09-28 SDOH — HEALTH STABILITY: MENTAL HEALTH: LITTLE INTEREST OR PLEASURE IN ACTIVITY?: NOT AT ALL

## 2021-09-28 SDOH — HEALTH STABILITY: MENTAL HEALTH: FEELING DOWN, DEPRESSED OR HOPELESS?: NOT AT ALL

## 2021-09-28 SDOH — ECONOMIC STABILITY: FOOD INSECURITY: HOW OFTEN IN THE PAST 12 MONTHS WERE YOU WORRIED OR STRESSED ABOUT HAVING ENOUGH MONEY TO BUY NUTRITIOUS MEALS?: NEVER

## 2021-09-28 SDOH — HEALTH STABILITY: MENTAL HEALTH: AUDIT TOTAL SCORE: 3

## 2021-09-28 SDOH — ECONOMIC STABILITY: GENERAL

## 2021-09-28 SDOH — HEALTH STABILITY: PHYSICAL HEALTH: ON AVERAGE, HOW MANY MINUTES DO YOU ENGAGE IN EXERCISE AT THIS LEVEL?: 30 MIN

## 2021-09-28 SDOH — HEALTH STABILITY: PHYSICAL HEALTH: ON AVERAGE, HOW MANY DAYS PER WEEK DO YOU ENGAGE IN MODERATE TO STRENUOUS EXERCISE (LIKE A BRISK WALK)?: 2 DAYS

## 2021-09-28 SDOH — HEALTH STABILITY: MENTAL HEALTH: HOW OFTEN DO YOU HAVE 6 OR MORE DRINKS ON ONE OCCASION?: NEVER

## 2021-09-28 SDOH — ECONOMIC STABILITY: HOUSING INSECURITY: ARE YOU WORRIED ABOUT LOSING YOUR HOUSING?: NO

## 2021-09-28 SDOH — HEALTH STABILITY: PHYSICAL HEALTH: ON AVERAGE, HOW MANY DAYS PER WEEK DO YOU ENGAGE IN MODERATE TO STRENUOUS EXERCISE (LIKE A BRISK WALK)?: 4 DAYS

## 2021-09-28 SDOH — ECONOMIC STABILITY: TRANSPORTATION INSECURITY
IN THE PAST 12 MONTHS, HAS THE LACK OF TRANSPORTATION KEPT YOU FROM MEDICAL APPOINTMENTS OR FROM GETTING MEDICATIONS?: NO

## 2021-09-28 ASSESSMENT — ACTIVITIES OF DAILY LIVING (ADL)
AMBULATION: INDEPENDENT
TOILETING: INDEPENDENT
DME_IN_USE: NO
DRESSING: INDEPENDENT
DRIVING: INDEPENDENT
GROOMING: INDEPENDENT
FUNCTIONAL_EVALUATION: PERFORMS ADLS INDEPENDENTLY
EATING: INDEPENDENT
MOBILITY: NO IMPAIRMENT OF MUSCULOSKELETAL, FINE OR GROSS MOTOR SKILLS
BATHING: INDEPENDENT

## 2021-09-28 ASSESSMENT — PATIENT HEALTH QUESTIONNAIRE - PHQ9
SUM OF ALL RESPONSES TO PHQ9 QUESTIONS 1 AND 2: 0
CLINICAL INTERPRETATION OF PHQ9 SCORE: NO FURTHER SCREENING NEEDED

## 2021-09-30 ENCOUNTER — NURSE ONLY (OUTPATIENT)
Dept: INTERNAL MEDICINE | Age: 66
End: 2021-09-30

## 2021-09-30 DIAGNOSIS — R53.83 FATIGUE, UNSPECIFIED TYPE: ICD-10-CM

## 2021-09-30 DIAGNOSIS — E78.00 PURE HYPERCHOLESTEROLEMIA: ICD-10-CM

## 2021-09-30 DIAGNOSIS — Z23 NEED FOR INFLUENZA VACCINATION: Primary | ICD-10-CM

## 2021-09-30 DIAGNOSIS — Z00.6 EXAMINATION OF PARTICIPANT OR CONTROL IN CLINICAL RESEARCH: Primary | ICD-10-CM

## 2021-09-30 DIAGNOSIS — Z23 NEED FOR PNEUMOCOCCAL VACCINATION: ICD-10-CM

## 2021-09-30 PROCEDURE — G0008 ADMIN INFLUENZA VIRUS VAC: HCPCS | Performed by: INTERNAL MEDICINE

## 2021-09-30 PROCEDURE — 90732 PPSV23 VACC 2 YRS+ SUBQ/IM: CPT | Performed by: INTERNAL MEDICINE

## 2021-09-30 PROCEDURE — 80061 LIPID PANEL: CPT | Performed by: CLINICAL MEDICAL LABORATORY

## 2021-09-30 PROCEDURE — G0009 ADMIN PNEUMOCOCCAL VACCINE: HCPCS | Performed by: INTERNAL MEDICINE

## 2021-09-30 PROCEDURE — 80053 COMPREHEN METABOLIC PANEL: CPT | Performed by: CLINICAL MEDICAL LABORATORY

## 2021-09-30 PROCEDURE — 85025 COMPLETE CBC W/AUTO DIFF WBC: CPT | Performed by: CLINICAL MEDICAL LABORATORY

## 2021-09-30 PROCEDURE — 90662 IIV NO PRSV INCREASED AG IM: CPT | Performed by: INTERNAL MEDICINE

## 2021-10-01 LAB
ALBUMIN SERPL-MCNC: 4 G/DL (ref 3.6–5.1)
ALBUMIN/GLOB SERPL: 1.5 {RATIO} (ref 1–2.4)
ALP SERPL-CCNC: 78 UNITS/L (ref 45–117)
ALT SERPL-CCNC: 24 UNITS/L
ANION GAP SERPL CALC-SCNC: 12 MMOL/L (ref 10–20)
AST SERPL-CCNC: 21 UNITS/L
BASOPHILS # BLD: 0.1 K/MCL (ref 0–0.3)
BASOPHILS NFR BLD: 1 %
BILIRUB SERPL-MCNC: 0.6 MG/DL (ref 0.2–1)
BUN SERPL-MCNC: 17 MG/DL (ref 6–20)
BUN/CREAT SERPL: 20 (ref 7–25)
CALCIUM SERPL-MCNC: 9.4 MG/DL (ref 8.4–10.2)
CHLORIDE SERPL-SCNC: 105 MMOL/L (ref 98–107)
CHOLEST SERPL-MCNC: 220 MG/DL
CHOLEST/HDLC SERPL: 3 {RATIO}
CO2 SERPL-SCNC: 29 MMOL/L (ref 21–32)
CREAT SERPL-MCNC: 0.83 MG/DL (ref 0.51–0.95)
DEPRECATED RDW RBC: 46.4 FL (ref 39–50)
EOSINOPHIL # BLD: 0.2 K/MCL (ref 0–0.5)
EOSINOPHIL NFR BLD: 3 %
ERYTHROCYTE [DISTWIDTH] IN BLOOD: 12.6 % (ref 11–15)
FASTING DURATION TIME PATIENT: ABNORMAL H
FASTING DURATION TIME PATIENT: NORMAL H
GFR SERPLBLD BASED ON 1.73 SQ M-ARVRAT: 73 ML/MIN
GLOBULIN SER-MCNC: 2.6 G/DL (ref 2–4)
GLUCOSE SERPL-MCNC: 74 MG/DL (ref 70–99)
HCT VFR BLD CALC: 42.4 % (ref 36–46.5)
HDLC SERPL-MCNC: 74 MG/DL
HGB BLD-MCNC: 14.2 G/DL (ref 12–15.5)
IMM GRANULOCYTES # BLD AUTO: 0 K/MCL (ref 0–0.2)
IMM GRANULOCYTES # BLD: 0 %
LDLC SERPL CALC-MCNC: 126 MG/DL
LYMPHOCYTES # BLD: 1.4 K/MCL (ref 1–4)
LYMPHOCYTES NFR BLD: 20 %
MCH RBC QN AUTO: 33.1 PG (ref 26–34)
MCHC RBC AUTO-ENTMCNC: 33.5 G/DL (ref 32–36.5)
MCV RBC AUTO: 98.8 FL (ref 78–100)
MONOCYTES # BLD: 0.7 K/MCL (ref 0.3–0.9)
MONOCYTES NFR BLD: 10 %
NEUTROPHILS # BLD: 4.6 K/MCL (ref 1.8–7.7)
NEUTROPHILS NFR BLD: 66 %
NONHDLC SERPL-MCNC: 146 MG/DL
NRBC BLD MANUAL-RTO: 0 /100 WBC
PLATELET # BLD AUTO: 257 K/MCL (ref 140–450)
POTASSIUM SERPL-SCNC: 3.9 MMOL/L (ref 3.4–5.1)
PROT SERPL-MCNC: 6.6 G/DL (ref 6.4–8.2)
RBC # BLD: 4.29 MIL/MCL (ref 4–5.2)
SODIUM SERPL-SCNC: 142 MMOL/L (ref 135–145)
TRIGL SERPL-MCNC: 102 MG/DL
WBC # BLD: 6.9 K/MCL (ref 4.2–11)

## 2021-10-12 ENCOUNTER — OFFICE VISIT (OUTPATIENT)
Dept: NEUROLOGY | Age: 66
End: 2021-10-12

## 2021-10-12 DIAGNOSIS — Z00.6 EXAMINATION OF PARTICIPANT OR CONTROL IN CLINICAL RESEARCH: ICD-10-CM

## 2021-10-12 PROCEDURE — 80061 LIPID PANEL: CPT | Performed by: CLINICAL MEDICAL LABORATORY

## 2021-10-12 PROCEDURE — 83036 HEMOGLOBIN GLYCOSYLATED A1C: CPT | Performed by: CLINICAL MEDICAL LABORATORY

## 2021-10-13 LAB
CHOLEST SERPL-MCNC: 234 MG/DL
CHOLEST/HDLC SERPL: 3.2 {RATIO}
FASTING DURATION TIME PATIENT: ABNORMAL H
HBA1C MFR BLD: 5.2 % (ref 4.5–5.6)
HDLC SERPL-MCNC: 74 MG/DL
LDLC SERPL CALC-MCNC: 129 MG/DL
NONHDLC SERPL-MCNC: 160 MG/DL
TRIGL SERPL-MCNC: 156 MG/DL

## 2021-10-15 ENCOUNTER — APPOINTMENT (OUTPATIENT)
Dept: ULTRASOUND IMAGING | Age: 66
End: 2021-10-15
Attending: INTERNAL MEDICINE

## 2021-10-20 ENCOUNTER — CASE MANAGEMENT (OUTPATIENT)
Dept: CARE COORDINATION | Age: 66
End: 2021-10-20

## 2021-10-23 ENCOUNTER — TELEPHONE (OUTPATIENT)
Dept: SCHEDULING | Age: 66
End: 2021-10-23

## 2021-10-24 ENCOUNTER — APPOINTMENT (OUTPATIENT)
Dept: URGENT CARE | Age: 66
End: 2021-10-24

## 2021-10-25 ENCOUNTER — TELEPHONE (OUTPATIENT)
Dept: GASTROENTEROLOGY | Age: 66
End: 2021-10-25

## 2021-10-25 RX ORDER — OMEPRAZOLE 40 MG/1
40 CAPSULE, DELAYED RELEASE ORAL DAILY
Qty: 90 CAPSULE | Refills: 3 | Status: SHIPPED | OUTPATIENT
Start: 2021-10-25 | End: 2022-09-19

## 2021-10-26 ENCOUNTER — E-ADVICE (OUTPATIENT)
Dept: INTERNAL MEDICINE | Age: 66
End: 2021-10-26

## 2021-10-26 DIAGNOSIS — M79.7 FIBROMYALGIA: ICD-10-CM

## 2021-10-27 ENCOUNTER — TELEPHONE (OUTPATIENT)
Dept: SCHEDULING | Age: 66
End: 2021-10-27

## 2021-10-29 ENCOUNTER — TELEPHONE (OUTPATIENT)
Dept: SCHEDULING | Age: 66
End: 2021-10-29

## 2021-10-29 RX ORDER — AMITRIPTYLINE HYDROCHLORIDE 10 MG/1
30 TABLET, FILM COATED ORAL NIGHTLY
Qty: 270 TABLET | Refills: 0 | Status: SHIPPED | OUTPATIENT
Start: 2021-10-29 | End: 2021-11-23 | Stop reason: SDUPTHER

## 2021-11-10 ENCOUNTER — EXTERNAL RECORD (OUTPATIENT)
Dept: HEALTH INFORMATION MANAGEMENT | Facility: OTHER | Age: 66
End: 2021-11-10

## 2021-11-12 ENCOUNTER — CASE MANAGEMENT (OUTPATIENT)
Dept: CARE COORDINATION | Age: 66
End: 2021-11-12

## 2021-11-22 ENCOUNTER — CASE MANAGEMENT (OUTPATIENT)
Dept: CARE COORDINATION | Age: 66
End: 2021-11-22

## 2021-11-22 ENCOUNTER — HOSPITAL ENCOUNTER (OUTPATIENT)
Dept: ULTRASOUND IMAGING | Age: 66
Discharge: HOME | End: 2021-11-22
Attending: INTERNAL MEDICINE

## 2021-11-22 ENCOUNTER — TELEPHONE (OUTPATIENT)
Dept: GASTROENTEROLOGY | Age: 66
End: 2021-11-22

## 2021-11-22 ENCOUNTER — TELEPHONE (OUTPATIENT)
Dept: INTERNAL MEDICINE | Age: 66
End: 2021-11-22

## 2021-11-22 DIAGNOSIS — Z12.39 ENCOUNTER FOR OTHER SCREENING FOR MALIGNANT NEOPLASM OF BREAST: ICD-10-CM

## 2021-11-22 DIAGNOSIS — R92.2 DENSE BREASTS: ICD-10-CM

## 2021-11-22 DIAGNOSIS — R92.30 DENSE BREASTS: ICD-10-CM

## 2021-11-22 DIAGNOSIS — M79.7 FIBROMYALGIA: ICD-10-CM

## 2021-11-22 PROCEDURE — 76641 ULTRASOUND BREAST COMPLETE: CPT

## 2021-11-23 ENCOUNTER — HOSPITAL ENCOUNTER (OUTPATIENT)
Dept: MRI IMAGING | Age: 66
Discharge: HOME | End: 2021-11-23
Attending: INTERNAL MEDICINE

## 2021-11-23 DIAGNOSIS — M06.09 RHEUMATOID ARTHRITIS OF MULTIPLE SITES WITHOUT RHEUMATOID FACTOR (CMD): ICD-10-CM

## 2021-11-23 PROCEDURE — 10002805 HB CONTRAST AGENT: Performed by: INTERNAL MEDICINE

## 2021-11-23 PROCEDURE — A9585 GADOBUTROL INJECTION: HCPCS | Performed by: INTERNAL MEDICINE

## 2021-11-23 PROCEDURE — 73220 MRI UPPR EXTREMITY W/O&W/DYE: CPT

## 2021-11-23 RX ORDER — GADOBUTROL 604.72 MG/ML
7 INJECTION INTRAVENOUS ONCE
Status: COMPLETED | OUTPATIENT
Start: 2021-11-23 | End: 2021-11-23

## 2021-11-23 RX ORDER — AMITRIPTYLINE HYDROCHLORIDE 10 MG/1
30 TABLET, FILM COATED ORAL NIGHTLY
Qty: 270 TABLET | Refills: 0 | Status: SHIPPED | OUTPATIENT
Start: 2021-11-23

## 2021-11-23 RX ADMIN — GADOBUTROL 7 ML: 604.72 INJECTION INTRAVENOUS at 18:25

## 2021-11-29 RX ORDER — FOLIC ACID 1 MG/1
TABLET ORAL DAILY
COMMUNITY
End: 2022-03-31 | Stop reason: ALTCHOICE

## 2021-12-02 ENCOUNTER — V-VISIT (OUTPATIENT)
Dept: GASTROENTEROLOGY | Age: 66
End: 2021-12-02

## 2021-12-02 DIAGNOSIS — K21.9 GASTROESOPHAGEAL REFLUX DISEASE WITHOUT ESOPHAGITIS: Primary | ICD-10-CM

## 2021-12-02 DIAGNOSIS — K44.9 HIATAL HERNIA: ICD-10-CM

## 2021-12-02 DIAGNOSIS — R10.13 EPIGASTRIC PAIN: ICD-10-CM

## 2021-12-02 PROCEDURE — 99213 OFFICE O/P EST LOW 20 MIN: CPT | Performed by: INTERNAL MEDICINE

## 2021-12-06 ENCOUNTER — CASE MANAGEMENT (OUTPATIENT)
Dept: CARE COORDINATION | Age: 66
End: 2021-12-06

## 2021-12-08 ENCOUNTER — CASE MANAGEMENT (OUTPATIENT)
Dept: CARE COORDINATION | Age: 66
End: 2021-12-08

## 2021-12-16 ENCOUNTER — CASE MANAGEMENT (OUTPATIENT)
Dept: CARE COORDINATION | Age: 66
End: 2021-12-16

## 2021-12-22 ENCOUNTER — CASE MANAGEMENT (OUTPATIENT)
Dept: CARE COORDINATION | Age: 66
End: 2021-12-22

## 2021-12-26 ENCOUNTER — TELEPHONE (OUTPATIENT)
Dept: SCHEDULING | Age: 66
End: 2021-12-26

## 2022-01-11 ENCOUNTER — OFFICE VISIT (OUTPATIENT)
Dept: NEUROLOGY | Age: 67
End: 2022-01-11

## 2022-01-11 VITALS
HEIGHT: 65 IN | WEIGHT: 132.94 LBS | BODY MASS INDEX: 22.15 KG/M2 | DIASTOLIC BLOOD PRESSURE: 72 MMHG | SYSTOLIC BLOOD PRESSURE: 141 MMHG | HEART RATE: 91 BPM

## 2022-01-11 DIAGNOSIS — R25.1 TREMOR OF LEFT HAND: Primary | ICD-10-CM

## 2022-01-11 PROCEDURE — 99214 OFFICE O/P EST MOD 30 MIN: CPT | Performed by: PSYCHIATRY & NEUROLOGY

## 2022-02-02 ENCOUNTER — EXTERNAL RECORD (OUTPATIENT)
Dept: HEALTH INFORMATION MANAGEMENT | Facility: OTHER | Age: 67
End: 2022-02-02

## 2022-02-03 ENCOUNTER — APPOINTMENT (OUTPATIENT)
Dept: NEUROLOGY | Age: 67
End: 2022-02-03

## 2022-02-22 ENCOUNTER — EXTERNAL RECORD (OUTPATIENT)
Dept: HEALTH INFORMATION MANAGEMENT | Facility: OTHER | Age: 67
End: 2022-02-22

## 2022-03-02 ENCOUNTER — IMAGING SERVICES (OUTPATIENT)
Dept: GENERAL RADIOLOGY | Age: 67
End: 2022-03-02
Attending: INTERNAL MEDICINE

## 2022-03-02 ENCOUNTER — OFFICE VISIT (OUTPATIENT)
Dept: INTERNAL MEDICINE | Age: 67
End: 2022-03-02

## 2022-03-02 VITALS
SYSTOLIC BLOOD PRESSURE: 121 MMHG | WEIGHT: 135 LBS | DIASTOLIC BLOOD PRESSURE: 66 MMHG | HEART RATE: 91 BPM | HEIGHT: 65 IN | OXYGEN SATURATION: 100 % | RESPIRATION RATE: 15 BRPM | TEMPERATURE: 97.3 F | BODY MASS INDEX: 22.49 KG/M2

## 2022-03-02 DIAGNOSIS — G89.29 CHRONIC LEFT SHOULDER PAIN: ICD-10-CM

## 2022-03-02 DIAGNOSIS — M25.512 CHRONIC LEFT SHOULDER PAIN: ICD-10-CM

## 2022-03-02 DIAGNOSIS — M54.2 CHRONIC NECK PAIN: ICD-10-CM

## 2022-03-02 DIAGNOSIS — G89.29 CHRONIC UPPER BACK PAIN: Primary | ICD-10-CM

## 2022-03-02 DIAGNOSIS — M06.9 RHEUMATOID ARTHRITIS, INVOLVING UNSPECIFIED SITE, UNSPECIFIED WHETHER RHEUMATOID FACTOR PRESENT (CMD): ICD-10-CM

## 2022-03-02 DIAGNOSIS — G89.29 CHRONIC NECK PAIN: ICD-10-CM

## 2022-03-02 DIAGNOSIS — M54.9 CHRONIC UPPER BACK PAIN: Primary | ICD-10-CM

## 2022-03-02 PROCEDURE — 73030 X-RAY EXAM OF SHOULDER: CPT | Performed by: INTERNAL MEDICINE

## 2022-03-02 PROCEDURE — 99214 OFFICE O/P EST MOD 30 MIN: CPT | Performed by: INTERNAL MEDICINE

## 2022-03-02 RX ORDER — METHOTREXATE 2.5 MG/1
TABLET ORAL
COMMUNITY
Start: 2022-02-21 | End: 2022-08-03 | Stop reason: ALTCHOICE

## 2022-03-02 RX ORDER — TIZANIDINE 4 MG/1
4 TABLET ORAL EVERY 8 HOURS PRN
Qty: 30 TABLET | Refills: 0 | Status: SHIPPED | OUTPATIENT
Start: 2022-03-02 | End: 2022-05-19 | Stop reason: SDUPTHER

## 2022-03-02 ASSESSMENT — PAIN SCALES - GENERAL: PAINLEVEL: 5

## 2022-03-23 ENCOUNTER — TELEPHONE (OUTPATIENT)
Dept: NEUROLOGY | Age: 67
End: 2022-03-23

## 2022-03-25 ENCOUNTER — HOSPITAL ENCOUNTER (OUTPATIENT)
Dept: MRI IMAGING | Age: 67
Discharge: HOME | End: 2022-03-25
Attending: INTERNAL MEDICINE

## 2022-03-25 DIAGNOSIS — M25.512 PAIN IN LEFT SHOULDER: ICD-10-CM

## 2022-03-25 DIAGNOSIS — M54.10 RADICULOPATHY, SITE UNSPECIFIED: ICD-10-CM

## 2022-03-25 DIAGNOSIS — G62.9 POLYNEUROPATHY, UNSPECIFIED: ICD-10-CM

## 2022-03-25 DIAGNOSIS — M54.10 RADICULOPATHY: Primary | ICD-10-CM

## 2022-03-25 PROCEDURE — 72141 MRI NECK SPINE W/O DYE: CPT

## 2022-03-29 ENCOUNTER — HOSPITAL ENCOUNTER (OUTPATIENT)
Dept: REHABILITATION | Age: 67
Discharge: STILL ADMITTED OR ADMINISTRATIVE | End: 2022-03-29
Attending: INTERNAL MEDICINE

## 2022-03-29 ENCOUNTER — E-ADVICE (OUTPATIENT)
Dept: INTERNAL MEDICINE | Age: 67
End: 2022-03-29

## 2022-03-29 DIAGNOSIS — M24.542 CONTRACTURE OF JOINT OF LEFT HAND: Primary | ICD-10-CM

## 2022-03-29 PROCEDURE — 97161 PT EVAL LOW COMPLEX 20 MIN: CPT | Performed by: PHYSICAL THERAPIST

## 2022-03-29 PROCEDURE — 97110 THERAPEUTIC EXERCISES: CPT | Performed by: PHYSICAL THERAPIST

## 2022-03-29 PROCEDURE — 97140 MANUAL THERAPY 1/> REGIONS: CPT | Performed by: PHYSICAL THERAPIST

## 2022-03-31 ENCOUNTER — HOSPITAL ENCOUNTER (OUTPATIENT)
Dept: REHABILITATION | Age: 67
Discharge: STILL ADMITTED OR ADMINISTRATIVE | End: 2022-03-31
Attending: INTERNAL MEDICINE

## 2022-03-31 PROCEDURE — 97110 THERAPEUTIC EXERCISES: CPT

## 2022-03-31 PROCEDURE — 97165 OT EVAL LOW COMPLEX 30 MIN: CPT

## 2022-03-31 ASSESSMENT — ENCOUNTER SYMPTOMS
QUALITY: SHARP
ALLEVIATING FACTORS: SUPPORTIVE DEVICE
PAIN SCALE AT HIGHEST: 8
PAIN SEVERITY NOW: 6
ALLEVIATING FACTORS: HEAT

## 2022-04-05 ENCOUNTER — HOSPITAL ENCOUNTER (OUTPATIENT)
Dept: REHABILITATION | Age: 67
Discharge: STILL ADMITTED OR ADMINISTRATIVE | End: 2022-04-05
Attending: INTERNAL MEDICINE

## 2022-04-05 PROCEDURE — 97140 MANUAL THERAPY 1/> REGIONS: CPT | Performed by: PHYSICAL THERAPIST

## 2022-04-05 PROCEDURE — 97110 THERAPEUTIC EXERCISES: CPT | Performed by: PHYSICAL THERAPIST

## 2022-04-08 ENCOUNTER — HOSPITAL ENCOUNTER (OUTPATIENT)
Dept: REHABILITATION | Age: 67
Discharge: STILL ADMITTED OR ADMINISTRATIVE | End: 2022-04-08
Attending: INTERNAL MEDICINE

## 2022-04-08 PROCEDURE — 97140 MANUAL THERAPY 1/> REGIONS: CPT | Performed by: OCCUPATIONAL THERAPIST

## 2022-04-08 PROCEDURE — 97110 THERAPEUTIC EXERCISES: CPT | Performed by: OCCUPATIONAL THERAPIST

## 2022-04-12 ENCOUNTER — HOSPITAL ENCOUNTER (OUTPATIENT)
Dept: REHABILITATION | Age: 67
Discharge: STILL ADMITTED OR ADMINISTRATIVE | End: 2022-04-12
Attending: INTERNAL MEDICINE

## 2022-04-12 PROCEDURE — 97140 MANUAL THERAPY 1/> REGIONS: CPT | Performed by: PHYSICAL THERAPIST

## 2022-04-12 PROCEDURE — 97110 THERAPEUTIC EXERCISES: CPT | Performed by: PHYSICAL THERAPIST

## 2022-04-13 ENCOUNTER — APPOINTMENT (OUTPATIENT)
Dept: REHABILITATION | Age: 67
End: 2022-04-13
Attending: INTERNAL MEDICINE

## 2022-04-13 DIAGNOSIS — Z00.6 EXAMINATION OF PARTICIPANT OR CONTROL IN CLINICAL RESEARCH: Primary | ICD-10-CM

## 2022-04-14 ENCOUNTER — APPOINTMENT (OUTPATIENT)
Dept: NEUROLOGY | Age: 67
End: 2022-04-14

## 2022-04-14 PROCEDURE — 80061 LIPID PANEL: CPT | Performed by: CLINICAL MEDICAL LABORATORY

## 2022-04-14 PROCEDURE — 36415 COLL VENOUS BLD VENIPUNCTURE: CPT | Performed by: FAMILY MEDICINE

## 2022-04-14 PROCEDURE — 82947 ASSAY GLUCOSE BLOOD QUANT: CPT | Performed by: CLINICAL MEDICAL LABORATORY

## 2022-04-14 PROCEDURE — 83036 HEMOGLOBIN GLYCOSYLATED A1C: CPT | Performed by: CLINICAL MEDICAL LABORATORY

## 2022-04-15 ENCOUNTER — APPOINTMENT (OUTPATIENT)
Dept: NEUROLOGY | Age: 67
End: 2022-04-15

## 2022-04-15 LAB
CHOLEST SERPL-MCNC: 222 MG/DL
CHOLEST/HDLC SERPL: 3.3 {RATIO}
FASTING DURATION TIME PATIENT: ABNORMAL H
FASTING DURATION TIME PATIENT: NORMAL H
GLUCOSE SERPL-MCNC: 89 MG/DL (ref 70–99)
HBA1C MFR BLD: 5.3 % (ref 4.5–5.6)
HDLC SERPL-MCNC: 68 MG/DL
LDLC SERPL CALC-MCNC: 129 MG/DL
NONHDLC SERPL-MCNC: 154 MG/DL
TRIGL SERPL-MCNC: 124 MG/DL

## 2022-04-16 ENCOUNTER — HOSPITAL ENCOUNTER (OUTPATIENT)
Dept: REHABILITATION | Age: 67
Discharge: STILL ADMITTED OR ADMINISTRATIVE | End: 2022-04-16
Attending: INTERNAL MEDICINE

## 2022-04-16 DIAGNOSIS — M24.542 CONTRACTURE, LEFT HAND: Primary | ICD-10-CM

## 2022-04-16 PROCEDURE — 97140 MANUAL THERAPY 1/> REGIONS: CPT | Performed by: OCCUPATIONAL THERAPIST

## 2022-04-18 ENCOUNTER — EXTERNAL RECORD (OUTPATIENT)
Dept: HEALTH INFORMATION MANAGEMENT | Facility: OTHER | Age: 67
End: 2022-04-18

## 2022-04-19 ENCOUNTER — HOSPITAL ENCOUNTER (OUTPATIENT)
Dept: REHABILITATION | Age: 67
Discharge: STILL ADMITTED OR ADMINISTRATIVE | End: 2022-04-19
Attending: INTERNAL MEDICINE

## 2022-04-19 PROCEDURE — 97140 MANUAL THERAPY 1/> REGIONS: CPT | Performed by: PHYSICAL THERAPIST

## 2022-04-21 ENCOUNTER — HOSPITAL ENCOUNTER (OUTPATIENT)
Dept: REHABILITATION | Age: 67
Discharge: STILL ADMITTED OR ADMINISTRATIVE | End: 2022-04-21
Attending: INTERNAL MEDICINE

## 2022-04-21 PROCEDURE — 97140 MANUAL THERAPY 1/> REGIONS: CPT

## 2022-04-21 PROCEDURE — 97110 THERAPEUTIC EXERCISES: CPT

## 2022-04-21 PROCEDURE — L3806 WHFO W/JOINT(S) CUSTOM FAB: HCPCS

## 2022-04-25 DIAGNOSIS — Z00.6 EXAMINATION OF PARTICIPANT OR CONTROL IN CLINICAL RESEARCH: Primary | ICD-10-CM

## 2022-04-26 ENCOUNTER — HOSPITAL ENCOUNTER (OUTPATIENT)
Dept: REHABILITATION | Age: 67
Discharge: STILL ADMITTED OR ADMINISTRATIVE | End: 2022-04-26
Attending: INTERNAL MEDICINE

## 2022-04-26 PROCEDURE — 97110 THERAPEUTIC EXERCISES: CPT | Performed by: PHYSICAL THERAPIST

## 2022-04-26 PROCEDURE — 97140 MANUAL THERAPY 1/> REGIONS: CPT | Performed by: PHYSICAL THERAPIST

## 2022-04-29 ENCOUNTER — HOSPITAL ENCOUNTER (OUTPATIENT)
Dept: REHABILITATION | Age: 67
Discharge: STILL ADMITTED OR ADMINISTRATIVE | End: 2022-04-29
Attending: INTERNAL MEDICINE

## 2022-04-29 PROCEDURE — 97110 THERAPEUTIC EXERCISES: CPT | Performed by: OCCUPATIONAL THERAPIST

## 2022-05-02 ENCOUNTER — TELEPHONE (OUTPATIENT)
Dept: NEUROLOGY | Age: 67
End: 2022-05-02

## 2022-05-02 ENCOUNTER — HOSPITAL ENCOUNTER (OUTPATIENT)
Dept: NEUROLOGY | Age: 67
Discharge: HOME | End: 2022-05-02
Attending: PSYCHIATRY & NEUROLOGY

## 2022-05-02 DIAGNOSIS — M54.10 RADICULOPATHY, UNSPECIFIED SPINAL REGION: ICD-10-CM

## 2022-05-02 DIAGNOSIS — R25.1 TREMOR OF LEFT HAND: Primary | ICD-10-CM

## 2022-05-02 DIAGNOSIS — M54.10 RADICULOPATHY: ICD-10-CM

## 2022-05-02 PROCEDURE — 95886 MUSC TEST DONE W/N TEST COMP: CPT | Performed by: PSYCHIATRY & NEUROLOGY

## 2022-05-02 PROCEDURE — 95910 NRV CNDJ TEST 7-8 STUDIES: CPT | Performed by: PSYCHIATRY & NEUROLOGY

## 2022-05-02 PROCEDURE — 95886 MUSC TEST DONE W/N TEST COMP: CPT

## 2022-05-02 PROCEDURE — 95910 NRV CNDJ TEST 7-8 STUDIES: CPT

## 2022-05-03 ENCOUNTER — HOSPITAL ENCOUNTER (OUTPATIENT)
Dept: REHABILITATION | Age: 67
Discharge: STILL ADMITTED OR ADMINISTRATIVE | End: 2022-05-03
Attending: INTERNAL MEDICINE

## 2022-05-03 PROCEDURE — 97140 MANUAL THERAPY 1/> REGIONS: CPT | Performed by: PHYSICAL THERAPIST

## 2022-05-03 PROCEDURE — 97110 THERAPEUTIC EXERCISES: CPT | Performed by: PHYSICAL THERAPIST

## 2022-05-05 ENCOUNTER — HOSPITAL ENCOUNTER (OUTPATIENT)
Dept: REHABILITATION | Age: 67
Discharge: STILL ADMITTED OR ADMINISTRATIVE | End: 2022-05-05
Attending: INTERNAL MEDICINE

## 2022-05-05 PROCEDURE — 97110 THERAPEUTIC EXERCISES: CPT

## 2022-05-05 PROCEDURE — 97140 MANUAL THERAPY 1/> REGIONS: CPT

## 2022-05-10 ENCOUNTER — APPOINTMENT (OUTPATIENT)
Dept: MRI IMAGING | Age: 67
End: 2022-05-10
Attending: PSYCHIATRY & NEUROLOGY

## 2022-05-10 ENCOUNTER — HOSPITAL ENCOUNTER (OUTPATIENT)
Dept: REHABILITATION | Age: 67
Discharge: STILL ADMITTED OR ADMINISTRATIVE | End: 2022-05-10
Attending: INTERNAL MEDICINE

## 2022-05-10 PROCEDURE — 97110 THERAPEUTIC EXERCISES: CPT | Performed by: PHYSICAL THERAPIST

## 2022-05-10 PROCEDURE — 97140 MANUAL THERAPY 1/> REGIONS: CPT | Performed by: PHYSICAL THERAPIST

## 2022-05-11 ENCOUNTER — EXTERNAL RECORD (OUTPATIENT)
Dept: HEALTH INFORMATION MANAGEMENT | Facility: OTHER | Age: 67
End: 2022-05-11

## 2022-05-12 ENCOUNTER — HOSPITAL ENCOUNTER (OUTPATIENT)
Dept: MRI IMAGING | Age: 67
Discharge: HOME | End: 2022-05-12
Attending: PSYCHIATRY & NEUROLOGY

## 2022-05-12 DIAGNOSIS — Z00.6 EXAMINATION OF PARTICIPANT OR CONTROL IN CLINICAL RESEARCH: ICD-10-CM

## 2022-05-12 PROCEDURE — 70551 MRI BRAIN STEM W/O DYE: CPT

## 2022-05-12 PROCEDURE — G1004 CDSM NDSC: HCPCS

## 2022-05-13 ENCOUNTER — TELEPHONE (OUTPATIENT)
Dept: NEUROLOGY | Age: 67
End: 2022-05-13

## 2022-05-14 ENCOUNTER — APPOINTMENT (OUTPATIENT)
Dept: REHABILITATION | Age: 67
End: 2022-05-14
Attending: INTERNAL MEDICINE

## 2022-05-17 ENCOUNTER — HOSPITAL ENCOUNTER (OUTPATIENT)
Dept: REHABILITATION | Age: 67
Discharge: STILL ADMITTED OR ADMINISTRATIVE | End: 2022-05-17
Attending: INTERNAL MEDICINE

## 2022-05-17 PROCEDURE — 97032 APPL MODALITY 1+ESTIM EA 15: CPT | Performed by: PHYSICAL THERAPIST

## 2022-05-17 PROCEDURE — 97140 MANUAL THERAPY 1/> REGIONS: CPT | Performed by: PHYSICAL THERAPIST

## 2022-05-19 ENCOUNTER — HOSPITAL ENCOUNTER (OUTPATIENT)
Dept: REHABILITATION | Age: 67
Discharge: STILL ADMITTED OR ADMINISTRATIVE | End: 2022-05-19
Attending: INTERNAL MEDICINE

## 2022-05-19 DIAGNOSIS — M54.2 CHRONIC NECK PAIN: ICD-10-CM

## 2022-05-19 DIAGNOSIS — G89.29 CHRONIC NECK PAIN: ICD-10-CM

## 2022-05-19 PROCEDURE — 97140 MANUAL THERAPY 1/> REGIONS: CPT

## 2022-05-19 PROCEDURE — 97110 THERAPEUTIC EXERCISES: CPT

## 2022-05-19 RX ORDER — TIZANIDINE 4 MG/1
4 TABLET ORAL EVERY 8 HOURS PRN
Qty: 30 TABLET | Refills: 0 | Status: SHIPPED | OUTPATIENT
Start: 2022-05-19

## 2022-05-19 ASSESSMENT — ENCOUNTER SYMPTOMS: PAIN SEVERITY NOW: 3

## 2022-05-23 ENCOUNTER — HOSPITAL ENCOUNTER (OUTPATIENT)
Dept: REHABILITATION | Age: 67
Discharge: STILL ADMITTED OR ADMINISTRATIVE | End: 2022-05-23
Attending: INTERNAL MEDICINE

## 2022-05-23 PROCEDURE — 97140 MANUAL THERAPY 1/> REGIONS: CPT | Performed by: OCCUPATIONAL THERAPIST

## 2022-05-24 ENCOUNTER — HOSPITAL ENCOUNTER (OUTPATIENT)
Dept: REHABILITATION | Age: 67
Discharge: STILL ADMITTED OR ADMINISTRATIVE | End: 2022-05-24
Attending: INTERNAL MEDICINE

## 2022-05-24 PROCEDURE — 97140 MANUAL THERAPY 1/> REGIONS: CPT | Performed by: PHYSICAL THERAPIST

## 2022-05-31 ENCOUNTER — HOSPITAL ENCOUNTER (OUTPATIENT)
Dept: REHABILITATION | Age: 67
Discharge: STILL ADMITTED OR ADMINISTRATIVE | End: 2022-05-31
Attending: INTERNAL MEDICINE

## 2022-05-31 PROCEDURE — 97140 MANUAL THERAPY 1/> REGIONS: CPT | Performed by: PHYSICAL THERAPIST

## 2022-05-31 PROCEDURE — 97164 PT RE-EVAL EST PLAN CARE: CPT | Performed by: PHYSICAL THERAPIST

## 2022-06-02 ENCOUNTER — TELEPHONE (OUTPATIENT)
Dept: NEUROLOGY | Age: 67
End: 2022-06-02

## 2022-06-10 ENCOUNTER — HOSPITAL ENCOUNTER (OUTPATIENT)
Dept: REHABILITATION | Age: 67
Discharge: STILL ADMITTED OR ADMINISTRATIVE | End: 2022-06-10
Attending: INTERNAL MEDICINE

## 2022-06-10 PROCEDURE — 97140 MANUAL THERAPY 1/> REGIONS: CPT | Performed by: PHYSICAL THERAPIST

## 2022-06-10 PROCEDURE — 97140 MANUAL THERAPY 1/> REGIONS: CPT | Performed by: OCCUPATIONAL THERAPIST

## 2022-06-13 ENCOUNTER — EXTERNAL RECORD (OUTPATIENT)
Dept: HEALTH INFORMATION MANAGEMENT | Facility: OTHER | Age: 67
End: 2022-06-13

## 2022-06-14 ENCOUNTER — HOSPITAL ENCOUNTER (OUTPATIENT)
Dept: REHABILITATION | Age: 67
Discharge: STILL ADMITTED OR ADMINISTRATIVE | End: 2022-06-14
Attending: INTERNAL MEDICINE

## 2022-06-14 PROCEDURE — 97140 MANUAL THERAPY 1/> REGIONS: CPT | Performed by: PHYSICAL THERAPIST

## 2022-06-17 ENCOUNTER — HOSPITAL ENCOUNTER (OUTPATIENT)
Dept: REHABILITATION | Age: 67
Discharge: STILL ADMITTED OR ADMINISTRATIVE | End: 2022-06-17
Attending: INTERNAL MEDICINE

## 2022-06-17 PROCEDURE — 97110 THERAPEUTIC EXERCISES: CPT | Performed by: OCCUPATIONAL THERAPIST

## 2022-06-17 PROCEDURE — 97140 MANUAL THERAPY 1/> REGIONS: CPT | Performed by: OCCUPATIONAL THERAPIST

## 2022-06-21 ENCOUNTER — HOSPITAL ENCOUNTER (OUTPATIENT)
Dept: REHABILITATION | Age: 67
Discharge: STILL ADMITTED OR ADMINISTRATIVE | End: 2022-06-21
Attending: INTERNAL MEDICINE

## 2022-06-21 PROCEDURE — 97140 MANUAL THERAPY 1/> REGIONS: CPT | Performed by: PHYSICAL THERAPIST

## 2022-06-22 ENCOUNTER — EXTERNAL RECORD (OUTPATIENT)
Dept: HEALTH INFORMATION MANAGEMENT | Facility: OTHER | Age: 67
End: 2022-06-22

## 2022-07-07 ENCOUNTER — CLINICAL ABSTRACT (OUTPATIENT)
Dept: HEALTH INFORMATION MANAGEMENT | Facility: OTHER | Age: 67
End: 2022-07-07

## 2022-07-08 ENCOUNTER — HOSPITAL ENCOUNTER (OUTPATIENT)
Dept: REHABILITATION | Age: 67
Discharge: STILL ADMITTED OR ADMINISTRATIVE | End: 2022-07-08
Attending: INTERNAL MEDICINE

## 2022-07-08 PROCEDURE — 97140 MANUAL THERAPY 1/> REGIONS: CPT | Performed by: PHYSICAL THERAPIST

## 2022-07-08 PROCEDURE — 97164 PT RE-EVAL EST PLAN CARE: CPT | Performed by: PHYSICAL THERAPIST

## 2022-07-08 PROCEDURE — 97110 THERAPEUTIC EXERCISES: CPT | Performed by: PHYSICAL THERAPIST

## 2022-07-12 ENCOUNTER — HOSPITAL ENCOUNTER (OUTPATIENT)
Dept: NUCLEAR MEDICINE | Age: 67
Discharge: HOME | End: 2022-07-12
Attending: PSYCHIATRY & NEUROLOGY

## 2022-07-12 DIAGNOSIS — R25.1 TREMOR OF LEFT HAND: ICD-10-CM

## 2022-07-12 PROCEDURE — G1004 CDSM NDSC: HCPCS

## 2022-07-12 PROCEDURE — 10006150 HB RX 343: Performed by: PSYCHIATRY & NEUROLOGY

## 2022-07-12 PROCEDURE — 78803 RP LOCLZJ TUM SPECT 1 AREA: CPT

## 2022-07-12 PROCEDURE — A9584 IODINE I-123 IOFLUPANE: HCPCS | Performed by: PSYCHIATRY & NEUROLOGY

## 2022-07-12 RX ADMIN — IOFLUPANE I-123 5.02 MILLICURIE: 2 INJECTION, SOLUTION INTRAVENOUS at 09:45

## 2022-07-16 ENCOUNTER — E-ADVICE (OUTPATIENT)
Dept: NEUROLOGY | Age: 67
End: 2022-07-16

## 2022-07-21 ENCOUNTER — TELEPHONE (OUTPATIENT)
Dept: NEUROLOGY | Age: 67
End: 2022-07-21

## 2022-08-03 ENCOUNTER — TELEPHONE (OUTPATIENT)
Dept: NEUROLOGY | Age: 67
End: 2022-08-03

## 2022-08-04 ENCOUNTER — TELEPHONE (OUTPATIENT)
Dept: NEUROLOGY | Age: 67
End: 2022-08-04

## 2022-08-12 ENCOUNTER — TELEPHONE (OUTPATIENT)
Dept: NEUROLOGY | Age: 67
End: 2022-08-12

## 2022-08-18 ENCOUNTER — OFFICE VISIT (OUTPATIENT)
Dept: NEUROLOGY | Age: 67
End: 2022-08-18

## 2022-08-18 ENCOUNTER — APPOINTMENT (OUTPATIENT)
Dept: NEUROLOGY | Age: 67
End: 2022-08-18

## 2022-08-18 VITALS
WEIGHT: 131.39 LBS | BODY MASS INDEX: 21.89 KG/M2 | DIASTOLIC BLOOD PRESSURE: 77 MMHG | HEIGHT: 65 IN | SYSTOLIC BLOOD PRESSURE: 143 MMHG | HEART RATE: 81 BPM

## 2022-08-18 DIAGNOSIS — G20.A1 PARKINSON'S DISEASE: Primary | ICD-10-CM

## 2022-08-18 PROCEDURE — 99215 OFFICE O/P EST HI 40 MIN: CPT | Performed by: PSYCHIATRY & NEUROLOGY

## 2022-09-19 RX ORDER — OMEPRAZOLE 40 MG/1
CAPSULE, DELAYED RELEASE ORAL
Qty: 90 CAPSULE | Refills: 0 | Status: SHIPPED | OUTPATIENT
Start: 2022-09-19 | End: 2023-04-20 | Stop reason: SDUPTHER

## 2022-09-21 ENCOUNTER — HOSPITAL ENCOUNTER (OUTPATIENT)
Dept: MAMMOGRAPHY | Age: 67
Discharge: HOME | End: 2022-09-21
Attending: INTERNAL MEDICINE

## 2022-09-21 DIAGNOSIS — R92.30 DENSE BREASTS: ICD-10-CM

## 2022-09-21 DIAGNOSIS — Z12.31 VISIT FOR SCREENING MAMMOGRAM: ICD-10-CM

## 2022-09-21 DIAGNOSIS — R92.2 DENSE BREASTS: ICD-10-CM

## 2022-09-21 DIAGNOSIS — Z12.39 ENCOUNTER FOR OTHER SCREENING FOR MALIGNANT NEOPLASM OF BREAST: Primary | ICD-10-CM

## 2022-09-21 PROCEDURE — 77063 BREAST TOMOSYNTHESIS BI: CPT

## 2022-09-26 ENCOUNTER — EXTERNAL RECORD (OUTPATIENT)
Dept: HEALTH INFORMATION MANAGEMENT | Facility: OTHER | Age: 67
End: 2022-09-26

## 2022-09-28 ENCOUNTER — EXTERNAL RECORD (OUTPATIENT)
Dept: HEALTH INFORMATION MANAGEMENT | Facility: OTHER | Age: 67
End: 2022-09-28

## 2022-09-29 ENCOUNTER — APPOINTMENT (OUTPATIENT)
Dept: NEUROLOGY | Age: 67
End: 2022-09-29

## 2022-10-04 ENCOUNTER — TELEPHONE (OUTPATIENT)
Dept: OTHER | Age: 67
End: 2022-10-04

## 2022-11-08 ENCOUNTER — EXTERNAL RECORD (OUTPATIENT)
Dept: HEALTH INFORMATION MANAGEMENT | Facility: OTHER | Age: 67
End: 2022-11-08

## 2022-11-14 DIAGNOSIS — G20.A1 PARKINSON'S DISEASE: ICD-10-CM

## 2022-11-15 ENCOUNTER — TELEPHONE (OUTPATIENT)
Dept: NEUROLOGY | Age: 67
End: 2022-11-15

## 2022-11-15 DIAGNOSIS — G20.A1 PARKINSON'S DISEASE: ICD-10-CM

## 2022-11-30 ENCOUNTER — APPOINTMENT (OUTPATIENT)
Dept: ULTRASOUND IMAGING | Age: 67
End: 2022-11-30
Attending: INTERNAL MEDICINE

## 2022-12-01 ENCOUNTER — HOSPITAL ENCOUNTER (OUTPATIENT)
Dept: ULTRASOUND IMAGING | Age: 67
Discharge: HOME | End: 2022-12-01
Attending: INTERNAL MEDICINE

## 2022-12-01 DIAGNOSIS — Z12.39 ENCOUNTER FOR OTHER SCREENING FOR MALIGNANT NEOPLASM OF BREAST: ICD-10-CM

## 2022-12-01 DIAGNOSIS — R92.30 DENSE BREASTS: ICD-10-CM

## 2022-12-01 DIAGNOSIS — R92.2 DENSE BREASTS: ICD-10-CM

## 2022-12-01 PROCEDURE — 76641 ULTRASOUND BREAST COMPLETE: CPT

## 2022-12-03 ENCOUNTER — WALK IN (OUTPATIENT)
Dept: URGENT CARE | Age: 67
End: 2022-12-03

## 2022-12-03 VITALS
DIASTOLIC BLOOD PRESSURE: 80 MMHG | BODY MASS INDEX: 21.66 KG/M2 | OXYGEN SATURATION: 98 % | HEIGHT: 65 IN | HEART RATE: 93 BPM | WEIGHT: 130 LBS | SYSTOLIC BLOOD PRESSURE: 136 MMHG | TEMPERATURE: 98.6 F | RESPIRATION RATE: 18 BRPM

## 2022-12-03 DIAGNOSIS — H61.22 IMPACTED CERUMEN OF LEFT EAR: ICD-10-CM

## 2022-12-03 DIAGNOSIS — J06.9 VIRAL URI WITH COUGH: Primary | ICD-10-CM

## 2022-12-03 DIAGNOSIS — R68.89 FLU-LIKE SYMPTOMS: ICD-10-CM

## 2022-12-03 LAB
FLUAV AG UPPER RESP QL IA.RAPID: NEGATIVE
FLUBV AG UPPER RESP QL IA.RAPID: NEGATIVE
INTERNAL PROCEDURAL CONTROLS ACCEPTABLE: YES

## 2022-12-03 PROCEDURE — 87804 INFLUENZA ASSAY W/OPTIC: CPT | Performed by: NURSE PRACTITIONER

## 2022-12-03 PROCEDURE — 99213 OFFICE O/P EST LOW 20 MIN: CPT | Performed by: NURSE PRACTITIONER

## 2022-12-03 ASSESSMENT — ENCOUNTER SYMPTOMS
ABDOMINAL PAIN: 0
COUGH: 1
VOMITING: 0
CHILLS: 0
FEVER: 0
SORE THROAT: 0
NAUSEA: 0
HEADACHES: 1

## 2022-12-03 ASSESSMENT — PAIN SCALES - GENERAL: PAINLEVEL: 2

## 2022-12-05 ENCOUNTER — TELEPHONE (OUTPATIENT)
Dept: NEUROLOGY | Age: 67
End: 2022-12-05

## 2022-12-05 DIAGNOSIS — Z13.820 ENCOUNTER FOR SCREENING FOR OSTEOPOROSIS: Primary | ICD-10-CM

## 2022-12-05 DIAGNOSIS — Z00.6 EXAMINATION OF PARTICIPANT OR CONTROL IN CLINICAL RESEARCH: Primary | ICD-10-CM

## 2022-12-08 ENCOUNTER — CLINICAL ABSTRACT (OUTPATIENT)
Dept: HEALTH INFORMATION MANAGEMENT | Facility: OTHER | Age: 67
End: 2022-12-08

## 2022-12-14 ENCOUNTER — APPOINTMENT (OUTPATIENT)
Dept: MAMMOGRAPHY | Age: 67
End: 2022-12-14
Attending: INTERNAL MEDICINE

## 2022-12-15 ENCOUNTER — APPOINTMENT (OUTPATIENT)
Dept: NEUROLOGY | Age: 67
End: 2022-12-15

## 2022-12-19 ENCOUNTER — APPOINTMENT (OUTPATIENT)
Dept: NEUROLOGY | Age: 67
End: 2022-12-19

## 2022-12-19 LAB
CHOLEST SERPL-MCNC: 195 MG/DL
CHOLEST/HDLC SERPL: 2.9 {RATIO}
FASTING DURATION TIME PATIENT: 1 HOURS (ref 0–999)
HBA1C MFR BLD: 5.4 % (ref 4.5–5.6)
HDLC SERPL-MCNC: 68 MG/DL
LDLC SERPL CALC-MCNC: 100 MG/DL
NONHDLC SERPL-MCNC: 127 MG/DL
TRIGL SERPL-MCNC: 136 MG/DL

## 2022-12-19 PROCEDURE — 80061 LIPID PANEL: CPT | Performed by: CLINICAL MEDICAL LABORATORY

## 2022-12-19 PROCEDURE — 83036 HEMOGLOBIN GLYCOSYLATED A1C: CPT | Performed by: CLINICAL MEDICAL LABORATORY

## 2022-12-20 ENCOUNTER — EXTERNAL RECORD (OUTPATIENT)
Dept: HEALTH INFORMATION MANAGEMENT | Facility: OTHER | Age: 67
End: 2022-12-20

## 2022-12-21 ENCOUNTER — EXTERNAL RECORD (OUTPATIENT)
Dept: HEALTH INFORMATION MANAGEMENT | Facility: OTHER | Age: 67
End: 2022-12-21

## 2023-01-05 ENCOUNTER — EXTERNAL RECORD (OUTPATIENT)
Dept: HEALTH INFORMATION MANAGEMENT | Facility: OTHER | Age: 68
End: 2023-01-05

## 2023-01-11 ENCOUNTER — HOSPITAL ENCOUNTER (OUTPATIENT)
Dept: MAMMOGRAPHY | Age: 68
End: 2023-01-11
Attending: INTERNAL MEDICINE

## 2023-03-03 ENCOUNTER — HOSPITAL ENCOUNTER (OUTPATIENT)
Dept: MAMMOGRAPHY | Age: 68
Discharge: HOME | End: 2023-03-03
Attending: INTERNAL MEDICINE

## 2023-03-03 DIAGNOSIS — Z13.820 ENCOUNTER FOR SCREENING FOR OSTEOPOROSIS: ICD-10-CM

## 2023-03-03 PROCEDURE — 77080 DXA BONE DENSITY AXIAL: CPT

## 2023-03-21 ENCOUNTER — EXTERNAL RECORD (OUTPATIENT)
Dept: HEALTH INFORMATION MANAGEMENT | Facility: OTHER | Age: 68
End: 2023-03-21

## 2023-03-21 ENCOUNTER — TELEPHONE (OUTPATIENT)
Dept: INTERNAL MEDICINE | Age: 68
End: 2023-03-21

## 2023-03-24 ENCOUNTER — EXTERNAL RECORD (OUTPATIENT)
Dept: HEALTH INFORMATION MANAGEMENT | Facility: OTHER | Age: 68
End: 2023-03-24

## 2023-03-28 SDOH — ECONOMIC STABILITY: FOOD INSECURITY: HOW OFTEN IN THE PAST 12 MONTHS WERE YOU WORRIED OR STRESSED ABOUT HAVING ENOUGH MONEY TO BUY NUTRITIOUS MEALS?: NEVER

## 2023-03-28 SDOH — HEALTH STABILITY: MENTAL HEALTH: HOW MANY STANDARD DRINKS CONTAINING ALCOHOL DO YOU HAVE ON A TYPICAL DAY?: 0,1 OR 2

## 2023-03-28 SDOH — ECONOMIC STABILITY: GENERAL

## 2023-03-28 SDOH — ECONOMIC STABILITY: HOUSING INSECURITY: ARE YOU WORRIED ABOUT LOSING YOUR HOUSING?: I CHOOSE NOT TO ANSWER THIS QUESTION

## 2023-03-28 SDOH — HEALTH STABILITY: MENTAL HEALTH: AUDIT TOTAL SCORE: 3

## 2023-03-28 SDOH — HEALTH STABILITY: PHYSICAL HEALTH: ON AVERAGE, HOW MANY DAYS PER WEEK DO YOU ENGAGE IN MODERATE TO STRENUOUS EXERCISE (LIKE A BRISK WALK)?: 2 DAYS

## 2023-03-28 SDOH — HEALTH STABILITY: MENTAL HEALTH: HOW OFTEN DO YOU HAVE A DRINK CONTAINING ALCOHOL?: 2 TO 3 TIMES PER WEEK

## 2023-03-28 SDOH — HEALTH STABILITY: PHYSICAL HEALTH: ON AVERAGE, HOW MANY MINUTES DO YOU ENGAGE IN EXERCISE AT THIS LEVEL?: 30 MIN

## 2023-03-28 SDOH — HEALTH STABILITY: MENTAL HEALTH: HOW OFTEN DO YOU HAVE 6 OR MORE DRINKS ON ONE OCCASION?: NEVER

## 2023-03-28 ASSESSMENT — PATIENT HEALTH QUESTIONNAIRE - PHQ9
CLINICAL INTERPRETATION OF PHQ2 SCORE: 0
2. FEELING DOWN, DEPRESSED OR HOPELESS: NOT AT ALL
SUM OF ALL RESPONSES TO PHQ9 QUESTIONS 1 AND 2: 0
CLINICAL INTERPRETATION OF PHQ2 SCORE: NO FURTHER SCREENING NEEDED
1. LITTLE INTEREST OR PLEASURE IN DOING THINGS: NOT AT ALL

## 2023-03-29 DIAGNOSIS — Z00.6 EXAMINATION OF PARTICIPANT OR CONTROL IN CLINICAL RESEARCH: Primary | ICD-10-CM

## 2023-04-04 ENCOUNTER — OFFICE VISIT (OUTPATIENT)
Dept: INTERNAL MEDICINE | Age: 68
End: 2023-04-04

## 2023-04-04 VITALS
SYSTOLIC BLOOD PRESSURE: 120 MMHG | TEMPERATURE: 98 F | OXYGEN SATURATION: 98 % | BODY MASS INDEX: 21.99 KG/M2 | WEIGHT: 132 LBS | RESPIRATION RATE: 14 BRPM | DIASTOLIC BLOOD PRESSURE: 76 MMHG | HEART RATE: 81 BPM | HEIGHT: 65 IN

## 2023-04-04 DIAGNOSIS — R92.30 DENSE BREASTS: ICD-10-CM

## 2023-04-04 DIAGNOSIS — G62.0 DRUG-INDUCED POLYNEUROPATHY (CMD): ICD-10-CM

## 2023-04-04 DIAGNOSIS — Z00.00 MEDICARE ANNUAL WELLNESS VISIT, SUBSEQUENT: Primary | ICD-10-CM

## 2023-04-04 DIAGNOSIS — J02.9 SORE THROAT: ICD-10-CM

## 2023-04-04 DIAGNOSIS — R92.2 DENSE BREASTS: ICD-10-CM

## 2023-04-04 DIAGNOSIS — G20.A1 PARKINSON'S DISEASE: ICD-10-CM

## 2023-04-04 DIAGNOSIS — J06.9 UPPER RESPIRATORY TRACT INFECTION, UNSPECIFIED TYPE: ICD-10-CM

## 2023-04-04 DIAGNOSIS — E55.9 VITAMIN D INSUFFICIENCY: ICD-10-CM

## 2023-04-04 DIAGNOSIS — Z12.31 ENCOUNTER FOR SCREENING MAMMOGRAM FOR MALIGNANT NEOPLASM OF BREAST: ICD-10-CM

## 2023-04-04 LAB
INTERNAL PROCEDURAL CONTROLS ACCEPTABLE: YES
INTERNAL PROCEDURAL CONTROLS ACCEPTABLE: YES
S PYO AG THROAT QL IA.RAPID: NEGATIVE
SARS-COV+SARS-COV-2 AG RESP QL IA.RAPID: NOT DETECTED
TEST LOT EXPIRATION DATE: NORMAL
TEST LOT EXPIRATION DATE: NORMAL
TEST LOT NUMBER: NORMAL
TEST LOT NUMBER: NORMAL

## 2023-04-04 PROCEDURE — 99213 OFFICE O/P EST LOW 20 MIN: CPT | Performed by: INTERNAL MEDICINE

## 2023-04-04 PROCEDURE — 36415 COLL VENOUS BLD VENIPUNCTURE: CPT | Performed by: INTERNAL MEDICINE

## 2023-04-04 PROCEDURE — 82306 VITAMIN D 25 HYDROXY: CPT | Performed by: CLINICAL MEDICAL LABORATORY

## 2023-04-04 PROCEDURE — 80053 COMPREHEN METABOLIC PANEL: CPT | Performed by: CLINICAL MEDICAL LABORATORY

## 2023-04-04 PROCEDURE — 87880 STREP A ASSAY W/OPTIC: CPT | Performed by: INTERNAL MEDICINE

## 2023-04-04 PROCEDURE — 85027 COMPLETE CBC AUTOMATED: CPT | Performed by: CLINICAL MEDICAL LABORATORY

## 2023-04-04 PROCEDURE — G0439 PPPS, SUBSEQ VISIT: HCPCS | Performed by: INTERNAL MEDICINE

## 2023-04-04 PROCEDURE — 87426 SARSCOV CORONAVIRUS AG IA: CPT | Performed by: INTERNAL MEDICINE

## 2023-04-04 RX ORDER — AZITHROMYCIN 250 MG/1
250 TABLET, FILM COATED ORAL SEE ADMIN INSTRUCTIONS
Qty: 6 TABLET | Refills: 0 | Status: SHIPPED | OUTPATIENT
Start: 2023-04-04 | End: 2023-04-20 | Stop reason: ALTCHOICE

## 2023-04-04 RX ORDER — BETAMETHASONE DIPROPIONATE 0.5 MG/G
CREAM TOPICAL DAILY
COMMUNITY

## 2023-04-04 ASSESSMENT — FRAILTY ASSESSMENTS
DO YOU EXPERIENCE PROBLEMS IN YOUR DAILY LIFE DUE TO POOR VISION: NO
DO YOU LIVE ALONE: YES
DO YOU HAVE TWO OR MORE DISEASES AND/OR CHRONIC DISORDERS: YES
DO YOU HAVE PROBLEMS WITH YOUR MEMORY: NO
DO YOU EXPERIENCE PROBLEMS IN YOUR DAILY LIFE DUE TO DIFFICULTY WALKING: NO
ARE YOU ABLE TO COPE WITH PROBLEMS WELL: YES
OVERALL, HOW HEALTHY WOULD YOU SAY YOUR LIFESTYLE IS: HEALTHY
HAVE YOU FELT NERVOUS OR ANXIOUS DURING THE LAST MONTH: NO
DO YOU EXPERIENCE PROBLEMS IN YOUR DAILY LIFE DUE TO LACK OF STRENGTH IN HANDS: NO
DO YOU EXPERIENCE PROBLEMS IN YOUR DAILY LIFE DUE TO PHYSICAL TIREDNESS: NO
HAVE YOU LOST A LOT OF WEIGHT RECENTLY WITHOUT WISHING TO - 6KG OR MORE DURING LAST SIX MONTHS OR 3KG IN LAST MONTH: NO
WHAT IS YOUR SEX: FEMALE
WHAT IS YOUR AGE: 69
DO YOU EXPERIENCE PROBLEMS IN YOUR DAILY LIFE DUE TO DIFFICULTY MAINTAINING BALANCE: NO
ARE YOU SATISFIED WITH YOUR HOME LIVING ENVIRONMENT: YES
TILBURG FRAILTY INDICATOR TOTAL SCORE: 1
DO YOU SOMETIMES MISS HAVING PEOPLE AROUND YOU: NO
HAVE YOU FELT DOWN DURING THE LAST MONTH: NO
DO YOU FEEL PHYSICALLY HEALTHY: YES
DO YOU EXPERIENCE PROBLEMS IN YOUR DAILY LIFE DUE TO POOR HEARING: NO
DO YOU RECEIVE ENOUGH SUPORT FROM OTHER PEOPLE: YES
HAVE YOU EXPERIENCED ONE OR MORE OF THE FOLLOWING EVENTS DURING THE PAST YEAR: HAVE NOT HAD ANY INDICATED EVENTS IN THE PAST YEAR

## 2023-04-04 ASSESSMENT — ANXIETY QUESTIONNAIRES
7. FEELING AFRAID AS IF SOMETHING AWFUL MIGHT HAPPEN: 0
1. FEELING NERVOUS, ANXIOUS, OR ON EDGE: NOT AT ALL
4. TROUBLE RELAXING: 0
3. WORRYING TOO MUCH ABOUT DIFFERENT THINGS: NOT AT ALL
5. BEING SO RESTLESS THAT IT IS HARD TO SIT STILL: NOT AT ALL
6. BECOMING EASILY ANNOYED OR IRRITABLE: 0
3. WORRYING TOO MUCH ABOUT DIFFERENT THINGS: 0
4. TROUBLE RELAXING: NOT AT ALL
2. NOT BEING ABLE TO STOP OR CONTROL WORRYING: NOT AT ALL
1. FEELING NERVOUS, ANXIOUS, OR ON EDGE: 0
6. BECOMING EASILY ANNOYED OR IRRITABLE: NOT AT ALL
7. FEELING AFRAID AS IF SOMETHING AWFUL MIGHT HAPPEN: NOT AT ALL
5. BEING SO RESTLESS THAT IT IS HARD TO SIT STILL: 0
IF YOU CHECKED OFF ANY PROBLEMS ON THIS QUESTIONNAIRE, HOW DIFFICULT HAVE THESE PROBLEMS MADE IT FOR YOU TO DO YOUR WORK, TAKE CARE OF THINGS AT HOME, OR GET ALONG WITH OTHER PEOPLE: NOT DIFFICULT AT ALL
GAD7 TOTAL SCORE: 0
2. NOT BEING ABLE TO STOP OR CONTROL WORRYING: 0

## 2023-04-04 ASSESSMENT — PAIN SCALES - GENERAL: PAINLEVEL: 3

## 2023-04-04 ASSESSMENT — GERIATRIC MINI NUTRITIONAL ASSESSMENT (MNA)
A HAS FOOD INTAKE DECLINED OVER THE PAST 3 MONTHS DUE TO LOSS OF APPETITE, DIGESTIVE PROBLEMS, CHEWING OR SWALLOWING DIFFICULTIES?: NO DECREASE IN FOOD INTAKE
E NEUROPSYCHOLOGICAL PROBLEMS: NO PSYCHOLOGICAL PROBLEMS
C GENERAL MOBILITY: GOES OUT
D HAS SUFFERED PSYCHOLOGICAL STRESS OR ACUTE DISEASE IN THE PAST 3 MONTHS?: NO
B WEIGHT LOSS DURING THE LAST 3 MONTHS: NO WEIGHT LOSS

## 2023-04-04 ASSESSMENT — GERIATRIC DEPRESSION SCALE SHORT VERSION (GDS-SV)
HAVE YOU DROPPED MANY OF YOUR ACTIVITIES AND INTERESTS?: NO
DO YOU PREFER TO STAY AT HOME, RATHER THAN GOING OUT AND DOING NEW THINGS: NO
DO YOU OFTEN GET BORED: NO
IS IT HARD FOR YOU TO GET STARTED ON NEW PROJECTS?: NO
DO YOU FIND LIFE VERY EXCITING: YES
DO YOU FEEL PRETTY WORTHLESS THE WAY YOU ARE NOW: NO
GDS TOTAL SCORE: 0
DO YOU FEEL YOU HAVE MORE PROBLEMS WITH MEMORY THAN MOST: NO
DO YOU THINK IT IS WONDERFUL TO BE ALIVE NOW: YES
DO YOU FEEL THAT YOUR LIFE IS EMPTY: NO
DO YOU FEEL HAPPY MOST OF THE TIME: YES
ARE YOU IN GOOD SPIRITS MOST OF THE TIME: YES
DO YOU OFTEN FEEL HELPLESS: NO
DO YOU FEEL FULL OF ENERGY: YES
ARE YOU AFRAID THAT SOMETHING BAD IS GOING TO HAPPEN TO YOU: NO
ARE YOU BASICALLY SATISFIED WITH YOUR LIFE: YES

## 2023-04-04 ASSESSMENT — PAIN SCALES - PAIN ENJOYMENT GENERAL ACTIVITY SCALE (PEG)
INTERFERED_GENERAL_ACTIVITY: 1
PEG_TOTALSCORE: 1
AVG_PAIN_PASTWEEK: 1
INTERFERED_ENJOYMENT_LIFE: 1

## 2023-04-04 ASSESSMENT — COGNITIVE AND FUNCTIONAL STATUS - GENERAL
DO YOU HAVE SERIOUS DIFFICULTY WALKING OR CLIMBING STAIRS: NO
ARE YOU BLIND OR DO YOU HAVE SERIOUS DIFFICULTY SEEING, EVEN WHEN WEARING GLASSES: NO
DO YOU HAVE DIFFICULTY DRESSING OR BATHING: NO
BECAUSE OF A PHYSICAL, MENTAL, OR EMOTIONAL CONDITION, DO YOU HAVE SERIOUS DIFFICULTY CONCENTRATING, REMEMBERING OR MAKING DECISIONS: NO
ARE YOU DEAF OR DO YOU HAVE SERIOUS DIFFICULTY  HEARING: NO
BECAUSE OF A PHYSICAL, MENTAL, OR EMOTIONAL CONDITION, DO YOU HAVE DIFFICULTY DOING ERRANDS ALONE: NO

## 2023-04-04 ASSESSMENT — ACTIVITIES OF DAILY LIVING (ADL)
NEEDS ASSISTANCE WITH FOOD: INDEPENDENT
TAKING MEDICATION: INDEPENDENT
ADL_SCORE: 12
ADL_BEFORE_ADMISSION: INDEPENDENT
ADL_SHORT_OF_BREATH: NO
MANAGING FINANCES: INDEPENDENT
EATING: INDEPENDENT
DOING HOUSEWORK: INDEPENDENT
STIL DRIVING: YES
PILL BOX USED: YES
PREPARING MEALS: INDEPENDENT
SENSORY_SUPPORT_DEVICES: EYEGLASSES
USING TRANSPORTATION: INDEPENDENT
USING TELEPHONE: INDEPENDENT
NEEDS_ASSIST: NO
GROCERY SHOPPING: INDEPENDENT
RECENT_DECLINE_ADL: NO

## 2023-04-05 LAB
25(OH)D3+25(OH)D2 SERPL-MCNC: 35.2 NG/ML (ref 30–100)
ALBUMIN SERPL-MCNC: 4.2 G/DL (ref 3.6–5.1)
ALBUMIN/GLOB SERPL: 1.5 {RATIO} (ref 1–2.4)
ALP SERPL-CCNC: 113 UNITS/L (ref 45–117)
ALT SERPL-CCNC: 40 UNITS/L
ANION GAP SERPL CALC-SCNC: 13 MMOL/L (ref 7–19)
AST SERPL-CCNC: 33 UNITS/L
BILIRUB SERPL-MCNC: 0.4 MG/DL (ref 0.2–1)
BUN SERPL-MCNC: 28 MG/DL (ref 6–20)
BUN/CREAT SERPL: 41 (ref 7–25)
CALCIUM SERPL-MCNC: 9.9 MG/DL (ref 8.4–10.2)
CHLORIDE SERPL-SCNC: 102 MMOL/L (ref 97–110)
CO2 SERPL-SCNC: 27 MMOL/L (ref 21–32)
CREAT SERPL-MCNC: 0.68 MG/DL (ref 0.51–0.95)
DEPRECATED RDW RBC: 45.2 FL (ref 39–50)
ERYTHROCYTE [DISTWIDTH] IN BLOOD: 12.8 % (ref 11–15)
FASTING DURATION TIME PATIENT: ABNORMAL H
GFR SERPLBLD BASED ON 1.73 SQ M-ARVRAT: >90 ML/MIN
GLOBULIN SER-MCNC: 2.8 G/DL (ref 2–4)
GLUCOSE SERPL-MCNC: 115 MG/DL (ref 70–99)
HCT VFR BLD CALC: 43.1 % (ref 36–46.5)
HGB BLD-MCNC: 14.1 G/DL (ref 12–15.5)
MCH RBC QN AUTO: 31.5 PG (ref 26–34)
MCHC RBC AUTO-ENTMCNC: 32.7 G/DL (ref 32–36.5)
MCV RBC AUTO: 96.2 FL (ref 78–100)
NRBC BLD MANUAL-RTO: 0 /100 WBC
PLATELET # BLD AUTO: 278 K/MCL (ref 140–450)
POTASSIUM SERPL-SCNC: 4.8 MMOL/L (ref 3.4–5.1)
PROT SERPL-MCNC: 7 G/DL (ref 6.4–8.2)
RBC # BLD: 4.48 MIL/MCL (ref 4–5.2)
SODIUM SERPL-SCNC: 137 MMOL/L (ref 135–145)
WBC # BLD: 7.6 K/MCL (ref 4.2–11)

## 2023-04-14 RX ORDER — OMEPRAZOLE 40 MG/1
CAPSULE, DELAYED RELEASE ORAL
Qty: 90 CAPSULE | Refills: 3 | OUTPATIENT
Start: 2023-04-14

## 2023-04-18 ENCOUNTER — HOSPITAL ENCOUNTER (OUTPATIENT)
Dept: PET IMAGING | Age: 68
Discharge: HOME | End: 2023-04-18
Attending: PSYCHIATRY & NEUROLOGY

## 2023-04-18 ENCOUNTER — APPOINTMENT (OUTPATIENT)
Dept: PET IMAGING | Age: 68
End: 2023-04-18
Attending: PSYCHIATRY & NEUROLOGY

## 2023-04-18 DIAGNOSIS — Z00.6 EXAMINATION OF PARTICIPANT OR CONTROL IN CLINICAL RESEARCH: ICD-10-CM

## 2023-04-18 PROCEDURE — 10006150 HB RX 343: Performed by: PSYCHIATRY & NEUROLOGY

## 2023-04-18 PROCEDURE — 78608 BRAIN IMAGING (PET): CPT

## 2023-04-18 PROCEDURE — G1004 CDSM NDSC: HCPCS

## 2023-04-18 RX ORDER — FLUDEOXYGLUCOSE F-18 300 MCI/ML
10.9 INJECTION INTRAVENOUS ONCE
Status: DISCONTINUED | OUTPATIENT
Start: 2023-04-18 | End: 2023-04-18

## 2023-04-18 RX ADMIN — FLORBETABEN F 18 8.4 MILLICURIE: 135 INJECTION, SOLUTION INTRAVENOUS at 13:10

## 2023-04-20 ENCOUNTER — OFFICE VISIT (OUTPATIENT)
Dept: GASTROENTEROLOGY | Age: 68
End: 2023-04-20

## 2023-04-20 VITALS
DIASTOLIC BLOOD PRESSURE: 79 MMHG | HEIGHT: 65 IN | WEIGHT: 139.2 LBS | BODY MASS INDEX: 23.19 KG/M2 | SYSTOLIC BLOOD PRESSURE: 133 MMHG | HEART RATE: 90 BPM

## 2023-04-20 DIAGNOSIS — K21.9 GASTROESOPHAGEAL REFLUX DISEASE WITHOUT ESOPHAGITIS: ICD-10-CM

## 2023-04-20 DIAGNOSIS — K44.9 HIATAL HERNIA: Primary | ICD-10-CM

## 2023-04-20 PROCEDURE — 99214 OFFICE O/P EST MOD 30 MIN: CPT | Performed by: STUDENT IN AN ORGANIZED HEALTH CARE EDUCATION/TRAINING PROGRAM

## 2023-04-20 RX ORDER — OMEPRAZOLE 40 MG/1
40 CAPSULE, DELAYED RELEASE ORAL DAILY
Qty: 90 CAPSULE | Refills: 3 | Status: SHIPPED | OUTPATIENT
Start: 2023-04-20

## 2023-04-22 ENCOUNTER — APPOINTMENT (OUTPATIENT)
Dept: MRI IMAGING | Age: 68
End: 2023-04-22
Attending: PSYCHIATRY & NEUROLOGY

## 2023-04-30 ENCOUNTER — V-VISIT (OUTPATIENT)
Dept: FAMILY MEDICINE | Age: 68
End: 2023-04-30

## 2023-04-30 DIAGNOSIS — H10.33 ACUTE BACTERIAL CONJUNCTIVITIS OF BOTH EYES: Primary | ICD-10-CM

## 2023-04-30 PROCEDURE — 99213 OFFICE O/P EST LOW 20 MIN: CPT | Performed by: NURSE PRACTITIONER

## 2023-04-30 RX ORDER — POLYMYXIN B SULFATE AND TRIMETHOPRIM 1; 10000 MG/ML; [USP'U]/ML
SOLUTION OPHTHALMIC
Qty: 10 ML | Refills: 0 | Status: SHIPPED | OUTPATIENT
Start: 2023-04-30 | End: 2023-05-07

## 2023-05-01 ENCOUNTER — APPOINTMENT (OUTPATIENT)
Dept: MRI IMAGING | Age: 68
End: 2023-05-01
Attending: PSYCHIATRY & NEUROLOGY

## 2023-05-03 ENCOUNTER — HOSPITAL ENCOUNTER (OUTPATIENT)
Dept: MRI IMAGING | Age: 68
Discharge: HOME | End: 2023-05-03
Attending: PSYCHIATRY & NEUROLOGY

## 2023-05-03 ENCOUNTER — HOSPITAL ENCOUNTER (OUTPATIENT)
Dept: PET IMAGING | Age: 68
Discharge: HOME | End: 2023-05-03
Attending: PSYCHIATRY & NEUROLOGY

## 2023-05-03 ENCOUNTER — APPOINTMENT (OUTPATIENT)
Dept: PET IMAGING | Age: 68
End: 2023-05-03
Attending: PSYCHIATRY & NEUROLOGY

## 2023-05-03 DIAGNOSIS — Z00.6 EXAMINATION OF PARTICIPANT OR CONTROL IN CLINICAL RESEARCH: ICD-10-CM

## 2023-05-03 PROCEDURE — 70551 MRI BRAIN STEM W/O DYE: CPT

## 2023-05-03 PROCEDURE — 78608 BRAIN IMAGING (PET): CPT

## 2023-05-03 PROCEDURE — G1004 CDSM NDSC: HCPCS

## 2023-05-10 ENCOUNTER — EXTERNAL RECORD (OUTPATIENT)
Dept: HEALTH INFORMATION MANAGEMENT | Facility: OTHER | Age: 68
End: 2023-05-10

## 2023-05-22 ENCOUNTER — TELEPHONE (OUTPATIENT)
Dept: INTERNAL MEDICINE | Age: 68
End: 2023-05-22

## 2023-05-24 ENCOUNTER — TELEPHONE (OUTPATIENT)
Dept: INTERNAL MEDICINE | Age: 68
End: 2023-05-24

## 2023-05-24 ENCOUNTER — V-VISIT (OUTPATIENT)
Dept: INTERNAL MEDICINE | Age: 68
End: 2023-05-24

## 2023-05-24 DIAGNOSIS — H57.9 ITCHY EYES: Primary | ICD-10-CM

## 2023-05-24 DIAGNOSIS — H10.33 ACUTE BACTERIAL CONJUNCTIVITIS OF BOTH EYES: ICD-10-CM

## 2023-05-24 PROCEDURE — 99213 OFFICE O/P EST LOW 20 MIN: CPT | Performed by: NURSE PRACTITIONER

## 2023-05-24 RX ORDER — OLOPATADINE HYDROCHLORIDE 1 MG/ML
1 SOLUTION/ DROPS OPHTHALMIC 2 TIMES DAILY
Qty: 5 ML | Refills: 1 | Status: SHIPPED | OUTPATIENT
Start: 2023-05-24

## 2023-05-24 RX ORDER — POLYMYXIN B SULFATE AND TRIMETHOPRIM 1; 10000 MG/ML; [USP'U]/ML
SOLUTION OPHTHALMIC
Qty: 10 ML | Refills: 0 | OUTPATIENT
Start: 2023-05-24 | End: 2023-05-31

## 2023-05-24 RX ORDER — POLYMYXIN B SULFATE AND TRIMETHOPRIM 1; 10000 MG/ML; [USP'U]/ML
1 SOLUTION OPHTHALMIC EVERY 4 HOURS
COMMUNITY
Start: 2023-04-30 | End: 2023-05-07

## 2023-05-24 ASSESSMENT — PATIENT HEALTH QUESTIONNAIRE - PHQ9
SUM OF ALL RESPONSES TO PHQ9 QUESTIONS 1 AND 2: 0
SUM OF ALL RESPONSES TO PHQ9 QUESTIONS 1 AND 2: 0
2. FEELING DOWN, DEPRESSED OR HOPELESS: NOT AT ALL
CLINICAL INTERPRETATION OF PHQ2 SCORE: NO FURTHER SCREENING NEEDED
1. LITTLE INTEREST OR PLEASURE IN DOING THINGS: NOT AT ALL

## 2023-05-30 ENCOUNTER — APPOINTMENT (OUTPATIENT)
Dept: NEUROLOGY | Age: 68
End: 2023-05-30

## 2023-05-30 DIAGNOSIS — Z00.6 EXAMINATION OF PARTICIPANT OR CONTROL IN CLINICAL RESEARCH: Primary | ICD-10-CM

## 2023-05-30 PROCEDURE — 83036 HEMOGLOBIN GLYCOSYLATED A1C: CPT | Performed by: CLINICAL MEDICAL LABORATORY

## 2023-05-30 PROCEDURE — 36415 COLL VENOUS BLD VENIPUNCTURE: CPT | Performed by: NURSE PRACTITIONER

## 2023-05-30 PROCEDURE — 80053 COMPREHEN METABOLIC PANEL: CPT | Performed by: CLINICAL MEDICAL LABORATORY

## 2023-05-30 PROCEDURE — 80061 LIPID PANEL: CPT | Performed by: CLINICAL MEDICAL LABORATORY

## 2023-05-31 LAB
ALBUMIN SERPL-MCNC: 4.1 G/DL (ref 3.6–5.1)
ALBUMIN/GLOB SERPL: 1.6 {RATIO} (ref 1–2.4)
ALP SERPL-CCNC: 87 UNITS/L (ref 45–117)
ALT SERPL-CCNC: 10 UNITS/L
ANION GAP SERPL CALC-SCNC: 9 MMOL/L (ref 7–19)
AST SERPL-CCNC: 18 UNITS/L
BILIRUB SERPL-MCNC: 0.7 MG/DL (ref 0.2–1)
BUN SERPL-MCNC: 30 MG/DL (ref 6–20)
BUN/CREAT SERPL: 42 (ref 7–25)
CALCIUM SERPL-MCNC: 9.4 MG/DL (ref 8.4–10.2)
CHLORIDE SERPL-SCNC: 103 MMOL/L (ref 97–110)
CHOLEST SERPL-MCNC: 204 MG/DL
CHOLEST/HDLC SERPL: 3.1 {RATIO}
CO2 SERPL-SCNC: 32 MMOL/L (ref 21–32)
CREAT SERPL-MCNC: 0.71 MG/DL (ref 0.51–0.95)
FASTING DURATION TIME PATIENT: ABNORMAL H
GFR SERPLBLD BASED ON 1.73 SQ M-ARVRAT: >90 ML/MIN
GLOBULIN SER-MCNC: 2.5 G/DL (ref 2–4)
GLUCOSE SERPL-MCNC: 74 MG/DL (ref 70–99)
HBA1C MFR BLD: 5.1 % (ref 4.5–5.6)
HDLC SERPL-MCNC: 65 MG/DL
LDLC SERPL CALC-MCNC: 118 MG/DL
NONHDLC SERPL-MCNC: 139 MG/DL
POTASSIUM SERPL-SCNC: 3.8 MMOL/L (ref 3.4–5.1)
PROT SERPL-MCNC: 6.6 G/DL (ref 6.4–8.2)
SODIUM SERPL-SCNC: 140 MMOL/L (ref 135–145)
TRIGL SERPL-MCNC: 103 MG/DL

## 2023-06-05 ENCOUNTER — CLINICAL ABSTRACT (OUTPATIENT)
Dept: HEALTH INFORMATION MANAGEMENT | Facility: OTHER | Age: 68
End: 2023-06-05

## 2023-06-22 ENCOUNTER — APPOINTMENT (OUTPATIENT)
Dept: NEUROLOGY | Age: 68
End: 2023-06-22

## 2023-09-21 ENCOUNTER — APPOINTMENT (OUTPATIENT)
Dept: MAMMOGRAPHY | Age: 68
End: 2023-09-21
Attending: INTERNAL MEDICINE

## 2023-09-22 ENCOUNTER — HOSPITAL ENCOUNTER (OUTPATIENT)
Dept: MAMMOGRAPHY | Age: 68
Discharge: HOME | End: 2023-09-22
Attending: INTERNAL MEDICINE

## 2023-09-22 DIAGNOSIS — Z12.31 VISIT FOR SCREENING MAMMOGRAM: ICD-10-CM

## 2023-09-22 PROCEDURE — 77063 BREAST TOMOSYNTHESIS BI: CPT

## 2023-09-25 ENCOUNTER — TELEPHONE (OUTPATIENT)
Dept: INTERNAL MEDICINE | Age: 68
End: 2023-09-25

## 2023-09-25 ENCOUNTER — E-ADVICE (OUTPATIENT)
Dept: INTERNAL MEDICINE | Age: 68
End: 2023-09-25

## 2023-09-29 ENCOUNTER — EXTERNAL RECORD (OUTPATIENT)
Dept: HEALTH INFORMATION MANAGEMENT | Facility: OTHER | Age: 68
End: 2023-09-29

## 2023-10-18 ENCOUNTER — E-VISIT (OUTPATIENT)
Dept: FAMILY MEDICINE | Age: 68
End: 2023-10-18

## 2023-10-18 ENCOUNTER — NURSE TRIAGE (OUTPATIENT)
Dept: TELEHEALTH | Age: 68
End: 2023-10-18

## 2023-10-18 ENCOUNTER — TELEPHONE (OUTPATIENT)
Dept: TELEHEALTH | Age: 68
End: 2023-10-18

## 2023-10-18 DIAGNOSIS — J30.2 SEASONAL ALLERGIES: ICD-10-CM

## 2023-10-18 DIAGNOSIS — G20.A1 PARKINSON'S DISEASE, UNSPECIFIED WHETHER DYSKINESIA PRESENT, UNSPECIFIED WHETHER MANIFESTATIONS FLUCTUATE: ICD-10-CM

## 2023-10-18 DIAGNOSIS — U07.1 INFECTION DUE TO 2019 NOVEL CORONAVIRUS: Primary | ICD-10-CM

## 2023-10-18 PROCEDURE — 99423 OL DIG E/M SVC 21+ MIN: CPT | Performed by: NURSE PRACTITIONER

## 2023-10-18 RX ORDER — FLUTICASONE PROPIONATE 50 MCG
2 SPRAY, SUSPENSION (ML) NASAL DAILY
Qty: 1 EACH | Refills: 0 | Status: SHIPPED | OUTPATIENT
Start: 2023-10-18 | End: 2023-10-28

## 2023-10-18 RX ORDER — GUAIFENESIN 600 MG/1
600 TABLET, EXTENDED RELEASE ORAL 2 TIMES DAILY
Qty: 20 TABLET | Refills: 0 | Status: SHIPPED | COMMUNITY
Start: 2023-10-18 | End: 2023-10-28

## 2023-10-18 RX ORDER — BENZONATATE 200 MG/1
200 CAPSULE ORAL 3 TIMES DAILY PRN
Qty: 21 CAPSULE | Refills: 0 | Status: SHIPPED | OUTPATIENT
Start: 2023-10-18 | End: 2023-10-25

## 2023-11-08 ENCOUNTER — TELEPHONE (OUTPATIENT)
Dept: INTERNAL MEDICINE | Age: 68
End: 2023-11-08

## 2024-01-17 ENCOUNTER — APPOINTMENT (OUTPATIENT)
Dept: MAMMOGRAPHY | Age: 69
End: 2024-01-17
Attending: INTERNAL MEDICINE

## 2024-01-24 ENCOUNTER — HOSPITAL ENCOUNTER (OUTPATIENT)
Dept: MAMMOGRAPHY | Age: 69
End: 2024-01-24
Attending: INTERNAL MEDICINE

## 2024-03-08 ENCOUNTER — APPOINTMENT (OUTPATIENT)
Dept: INTERNAL MEDICINE | Age: 69
End: 2024-03-08

## 2024-03-19 ENCOUNTER — EXTERNAL RECORD (OUTPATIENT)
Dept: HEALTH INFORMATION MANAGEMENT | Facility: OTHER | Age: 69
End: 2024-03-19

## 2024-03-22 ENCOUNTER — EXTERNAL RECORD (OUTPATIENT)
Dept: HEALTH INFORMATION MANAGEMENT | Facility: OTHER | Age: 69
End: 2024-03-22

## 2024-04-02 ENCOUNTER — APPOINTMENT (OUTPATIENT)
Dept: URBAN - METROPOLITAN AREA CLINIC 240 | Age: 69
Setting detail: DERMATOLOGY
End: 2024-04-02

## 2024-04-02 DIAGNOSIS — D18.0 HEMANGIOMA: ICD-10-CM

## 2024-04-02 DIAGNOSIS — D36.1 BENIGN NEOPLASM OF PERIPHERAL NERVES AND AUTONOMIC NERVOUS SYSTEM: ICD-10-CM

## 2024-04-02 DIAGNOSIS — D49.2 NEOPLASM OF UNSPECIFIED BEHAVIOR OF BONE, SOFT TISSUE, AND SKIN: ICD-10-CM

## 2024-04-02 DIAGNOSIS — L82.1 OTHER SEBORRHEIC KERATOSIS: ICD-10-CM

## 2024-04-02 DIAGNOSIS — Z71.89 OTHER SPECIFIED COUNSELING: ICD-10-CM

## 2024-04-02 DIAGNOSIS — L92.0 GRANULOMA ANNULARE: ICD-10-CM

## 2024-04-02 DIAGNOSIS — M72.0 PALMAR FASCIAL FIBROMATOSIS [DUPUYTREN]: ICD-10-CM

## 2024-04-02 DIAGNOSIS — L81.4 OTHER MELANIN HYPERPIGMENTATION: ICD-10-CM

## 2024-04-02 DIAGNOSIS — H02.40 UNSPECIFIED PTOSIS OF EYELID: ICD-10-CM

## 2024-04-02 PROBLEM — D18.01 HEMANGIOMA OF SKIN AND SUBCUTANEOUS TISSUE: Status: ACTIVE | Noted: 2024-04-02

## 2024-04-02 PROBLEM — H02.403 UNSPECIFIED PTOSIS OF BILATERAL EYELIDS: Status: ACTIVE | Noted: 2024-04-02

## 2024-04-02 PROBLEM — D36.14 BENIGN NEOPLASM OF PERIPHERAL NERVES AND AUTONOMIC NERVOUS SYSTEM OF THORAX: Status: ACTIVE | Noted: 2024-04-02

## 2024-04-02 PROCEDURE — OTHER REASSURANCE: OTHER

## 2024-04-02 PROCEDURE — OTHER PHOTO-DOCUMENTATION: OTHER

## 2024-04-02 PROCEDURE — 99203 OFFICE O/P NEW LOW 30 MIN: CPT

## 2024-04-02 PROCEDURE — OTHER PRESCRIPTION: OTHER

## 2024-04-02 PROCEDURE — OTHER PRESCRIPTION MEDICATION MANAGEMENT: OTHER

## 2024-04-02 PROCEDURE — OTHER DEFER: OTHER

## 2024-04-02 PROCEDURE — OTHER COUNSELING: OTHER

## 2024-04-02 PROCEDURE — OTHER ADDITIONAL NOTES: OTHER

## 2024-04-02 RX ORDER — BETAMETHASONE DIPROPIONATE 0.5 MG/G
OINTMENT TOPICAL
Qty: 45 | Refills: 2 | Status: ERX | COMMUNITY
Start: 2024-04-02

## 2024-04-02 ASSESSMENT — LOCATION SIMPLE DESCRIPTION DERM
LOCATION SIMPLE: RIGHT FOREHEAD
LOCATION SIMPLE: RIGHT HAND
LOCATION SIMPLE: UPPER BACK
LOCATION SIMPLE: LEFT HAND
LOCATION SIMPLE: LEFT SUPERIOR EYELID
LOCATION SIMPLE: RIGHT SUPERIOR EYELID
LOCATION SIMPLE: RIGHT UPPER BACK

## 2024-04-02 ASSESSMENT — LOCATION ZONE DERM
LOCATION ZONE: FACE
LOCATION ZONE: EYELID
LOCATION ZONE: HAND
LOCATION ZONE: TRUNK

## 2024-04-02 ASSESSMENT — LOCATION DETAILED DESCRIPTION DERM
LOCATION DETAILED: SUPERIOR THORACIC SPINE
LOCATION DETAILED: RIGHT LATERAL SUPERIOR EYELID
LOCATION DETAILED: RIGHT SUPERIOR MEDIAL UPPER BACK
LOCATION DETAILED: RIGHT LATERAL FOREHEAD
LOCATION DETAILED: RIGHT ULNAR DORSAL HAND
LOCATION DETAILED: LEFT ULNAR DORSAL HAND
LOCATION DETAILED: LEFT MEDIAL SUPERIOR EYELID

## 2024-04-02 NOTE — PROCEDURE: ADDITIONAL NOTES
Additional Notes: MARILY today
Render Risk Assessment In Note?: no
Detail Level: Simple
Additional Notes: Pt bothered by upper eyelid drooping. Recommend consult with Dr. Telma Moore.
Additional Notes: Pt states it has not been diagnosed as this yet. Pt currently receiving Botox injections to the hand. Recommend a second opinion with Dr. Alana Chun. Phone number given to pt.

## 2024-04-02 NOTE — HPI: FULL BODY SKIN EXAMINATION
What Is The Reason For Today's Visit?: Full Body Skin Examination
What Is The Reason For Today's Visit? (Being Monitored For X): concerning skin lesions on an annual basis
Additional History: Pt reports a bother some lesion on forehead. 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

## 2024-04-02 NOTE — PROCEDURE: DEFER
Detail Level: Detailed
Size Of Lesion In Cm (Optional): 1.5
Introduction Text (Please End With A Colon): The following procedure was deferred:
Instructions (Optional): Schedule 30 minute appt for complete removal.
Procedure To Be Performed At Next Visit: Excision

## 2024-04-02 NOTE — PROCEDURE: PRESCRIPTION MEDICATION MANAGEMENT
Initiate Treatment: Betamethasone ointment twice daily for 2 weeks. Then 2 weeks off and 2 weeks on.
Detail Level: Zone
Render In Strict Bullet Format?: No

## 2024-04-09 ENCOUNTER — APPOINTMENT (OUTPATIENT)
Dept: URBAN - METROPOLITAN AREA CLINIC 240 | Age: 69
Setting detail: DERMATOLOGY
End: 2024-04-09

## 2024-04-09 DIAGNOSIS — D49.2 NEOPLASM OF UNSPECIFIED BEHAVIOR OF BONE, SOFT TISSUE, AND SKIN: ICD-10-CM

## 2024-04-09 PROCEDURE — 11402 EXC TR-EXT B9+MARG 1.1-2 CM: CPT

## 2024-04-09 PROCEDURE — 12032 INTMD RPR S/A/T/EXT 2.6-7.5: CPT

## 2024-04-09 PROCEDURE — OTHER EXCISION: OTHER

## 2024-04-09 ASSESSMENT — LOCATION ZONE DERM: LOCATION ZONE: TRUNK

## 2024-04-09 ASSESSMENT — LOCATION DETAILED DESCRIPTION DERM: LOCATION DETAILED: LEFT INFERIOR LATERAL MIDBACK

## 2024-04-09 ASSESSMENT — LOCATION SIMPLE DESCRIPTION DERM: LOCATION SIMPLE: LEFT LOWER BACK

## 2024-04-09 NOTE — PROCEDURE: EXCISION

## 2024-04-23 ENCOUNTER — APPOINTMENT (OUTPATIENT)
Dept: URBAN - METROPOLITAN AREA CLINIC 240 | Age: 69
Setting detail: DERMATOLOGY
End: 2024-04-23

## 2024-04-23 DIAGNOSIS — Z48.02 ENCOUNTER FOR REMOVAL OF SUTURES: ICD-10-CM

## 2024-04-23 PROCEDURE — OTHER PHOTO-DOCUMENTATION: OTHER

## 2024-04-23 PROCEDURE — OTHER SUTURE REMOVAL (GLOBAL PERIOD): OTHER

## 2024-04-23 PROCEDURE — OTHER COUNSELING: OTHER

## 2024-04-23 PROCEDURE — OTHER MIPS QUALITY: OTHER

## 2024-04-23 ASSESSMENT — LOCATION DETAILED DESCRIPTION DERM: LOCATION DETAILED: LEFT INFERIOR LATERAL MIDBACK

## 2024-04-23 ASSESSMENT — LOCATION SIMPLE DESCRIPTION DERM: LOCATION SIMPLE: LEFT LOWER BACK

## 2024-04-23 ASSESSMENT — LOCATION ZONE DERM: LOCATION ZONE: TRUNK

## 2024-04-23 NOTE — PROCEDURE: MIPS QUALITY
Name And Contact Information For Health Care Proxy: Ivone Penn (daughter) [phone]
Detail Level: Detailed
Quality 226: Preventive Care And Screening: Tobacco Use: Screening And Cessation Intervention: Patient screened for tobacco use and is an ex/non-smoker
Quality 130: Documentation Of Current Medications In The Medical Record: Current Medications Documented
Quality 431: Preventive Care And Screening: Unhealthy Alcohol Use - Screening: Patient not identified as an unhealthy alcohol user when screened for unhealthy alcohol use using a systematic screening method
Quality 47: Advance Care Plan: Advance Care Planning discussed and documented; advance care plan or surrogate decision maker documented in the medical record.

## 2024-04-23 NOTE — PROCEDURE: SUTURE REMOVAL (GLOBAL PERIOD)
Detail Level: Detailed
Add 43698 Cpt? (Important Note: In 2017 The Use Of 87382 Is Being Tracked By Cms To Determine Future Global Period Reimbursement For Global Periods): no

## 2024-08-08 ENCOUNTER — APPOINTMENT (OUTPATIENT)
Dept: URGENT CARE | Age: 69
End: 2024-08-08

## 2024-09-01 PROBLEM — E55.9 VITAMIN D DEFICIENCY: Status: RESOLVED | Noted: 2019-03-27 | Resolved: 2024-09-01

## 2024-09-02 PROBLEM — R53.83 FATIGUE: Status: RESOLVED | Noted: 2021-09-21 | Resolved: 2024-09-02

## 2024-09-02 PROBLEM — R10.13 EPIGASTRIC PAIN: Status: RESOLVED | Noted: 2021-12-02 | Resolved: 2024-09-02

## 2024-09-03 ENCOUNTER — APPOINTMENT (OUTPATIENT)
Dept: INTERNAL MEDICINE | Age: 69
End: 2024-09-03

## 2024-11-07 ENCOUNTER — WALK IN (OUTPATIENT)
Dept: URGENT CARE | Age: 69
End: 2024-11-07
Attending: FAMILY MEDICINE

## 2024-11-07 VITALS
SYSTOLIC BLOOD PRESSURE: 155 MMHG | TEMPERATURE: 97 F | RESPIRATION RATE: 18 BRPM | DIASTOLIC BLOOD PRESSURE: 71 MMHG | HEIGHT: 65 IN | HEART RATE: 92 BPM | OXYGEN SATURATION: 97 % | BODY MASS INDEX: 21.99 KG/M2 | WEIGHT: 132 LBS

## 2024-11-07 DIAGNOSIS — J30.9 ALLERGIC SINUSITIS: Primary | ICD-10-CM

## 2024-11-07 RX ORDER — PREDNISONE 10 MG/1
TABLET ORAL
Qty: 21 TABLET | Refills: 0 | Status: SHIPPED | OUTPATIENT
Start: 2024-11-07

## 2024-11-07 ASSESSMENT — PAIN SCALES - GENERAL: PAINLEVEL: 0

## 2024-11-09 SDOH — ECONOMIC STABILITY: FOOD INSECURITY: WITHIN THE PAST 12 MONTHS, THE FOOD YOU BOUGHT JUST DIDN'T LAST AND YOU DIDN'T HAVE MONEY TO GET MORE.: NEVER TRUE

## 2024-11-09 SDOH — ECONOMIC STABILITY: GENERAL: WOULD YOU LIKE HELP WITH ANY OF THE FOLLOWING NEEDS?: I DON'T WANT HELP WITH ANY OF THESE

## 2024-11-09 SDOH — ECONOMIC STABILITY: HOUSING INSECURITY: WHAT IS YOUR LIVING SITUATION TODAY?: I HAVE A STEADY PLACE TO LIVE

## 2024-11-09 SDOH — ECONOMIC STABILITY: HOUSING INSECURITY: DO YOU HAVE PROBLEMS WITH ANY OF THE FOLLOWING?: NONE OF THE ABOVE

## 2024-11-09 SDOH — ECONOMIC STABILITY: TRANSPORTATION INSECURITY
IN THE PAST 12 MONTHS, HAS LACK OF RELIABLE TRANSPORTATION KEPT YOU FROM MEDICAL APPOINTMENTS, MEETINGS, WORK OR FROM GETTING THINGS NEEDED FOR DAILY LIVING?: NO

## 2024-11-09 ASSESSMENT — SOCIAL DETERMINANTS OF HEALTH (SDOH): IN THE PAST 12 MONTHS, HAS THE ELECTRIC, GAS, OIL, OR WATER COMPANY THREATENED TO SHUT OFF SERVICE IN YOUR HOME?: NO

## 2024-11-09 ASSESSMENT — PATIENT HEALTH QUESTIONNAIRE - PHQ9
2. FEELING DOWN, DEPRESSED OR HOPELESS: NOT AT ALL
SUM OF ALL RESPONSES TO PHQ9 QUESTIONS 1 AND 2: 0
1. LITTLE INTEREST OR PLEASURE IN DOING THINGS: NOT AT ALL
CLINICAL INTERPRETATION OF PHQ2 SCORE: 0
CLINICAL INTERPRETATION OF PHQ2 SCORE: NO FURTHER SCREENING NEEDED

## 2024-11-12 ENCOUNTER — TELEPHONE (OUTPATIENT)
Dept: INTERNAL MEDICINE | Age: 69
End: 2024-11-12

## 2024-11-13 ENCOUNTER — APPOINTMENT (OUTPATIENT)
Dept: INTERNAL MEDICINE | Age: 69
End: 2024-11-13

## 2024-11-14 ENCOUNTER — TELEPHONE (OUTPATIENT)
Dept: GASTROENTEROLOGY | Age: 69
End: 2024-11-14

## 2024-11-14 DIAGNOSIS — K21.9 GASTROESOPHAGEAL REFLUX DISEASE WITHOUT ESOPHAGITIS: Primary | ICD-10-CM

## 2024-11-14 RX ORDER — OMEPRAZOLE 40 MG/1
40 CAPSULE, DELAYED RELEASE ORAL DAILY
Qty: 90 CAPSULE | Refills: 0 | Status: SHIPPED | OUTPATIENT
Start: 2024-11-14

## 2024-11-26 ENCOUNTER — APPOINTMENT (OUTPATIENT)
Dept: MAMMOGRAPHY | Age: 69
End: 2024-11-26

## 2024-11-26 DIAGNOSIS — Z12.31 ENCOUNTER FOR SCREENING MAMMOGRAM FOR MALIGNANT NEOPLASM OF BREAST: ICD-10-CM

## 2024-11-26 PROCEDURE — 77067 SCR MAMMO BI INCL CAD: CPT

## 2024-12-09 SDOH — HEALTH STABILITY: MENTAL HEALTH: HOW MANY STANDARD DRINKS CONTAINING ALCOHOL DO YOU HAVE ON A TYPICAL DAY?: 0,1 OR 2

## 2024-12-09 SDOH — HEALTH STABILITY: MENTAL HEALTH: HOW OFTEN DO YOU HAVE A DRINK CONTAINING ALCOHOL?: 2 TO 3 TIMES PER WEEK

## 2024-12-09 SDOH — HEALTH STABILITY: MENTAL HEALTH: AUDIT TOTAL SCORE: 3

## 2024-12-09 SDOH — HEALTH STABILITY: MENTAL HEALTH: HOW OFTEN DO YOU HAVE 6 OR MORE DRINKS ON ONE OCCASION?: NEVER

## 2024-12-09 ASSESSMENT — PATIENT HEALTH QUESTIONNAIRE - PHQ9
SUM OF ALL RESPONSES TO PHQ9 QUESTIONS 1 AND 2: 0
CLINICAL INTERPRETATION OF PHQ2 SCORE: NO FURTHER SCREENING NEEDED
2. FEELING DOWN, DEPRESSED OR HOPELESS: NOT AT ALL
CLINICAL INTERPRETATION OF PHQ2 SCORE: 0
1. LITTLE INTEREST OR PLEASURE IN DOING THINGS: NOT AT ALL

## 2024-12-10 ENCOUNTER — APPOINTMENT (OUTPATIENT)
Dept: INTERNAL MEDICINE | Age: 69
End: 2024-12-10

## 2024-12-10 ENCOUNTER — TELEPHONE (OUTPATIENT)
Dept: INTERNAL MEDICINE | Age: 69
End: 2024-12-10

## 2024-12-10 VITALS
HEIGHT: 65 IN | WEIGHT: 133 LBS | OXYGEN SATURATION: 98 % | BODY MASS INDEX: 22.16 KG/M2 | SYSTOLIC BLOOD PRESSURE: 136 MMHG | HEART RATE: 92 BPM | DIASTOLIC BLOOD PRESSURE: 80 MMHG

## 2024-12-10 DIAGNOSIS — R73.01 FASTING HYPERGLYCEMIA: ICD-10-CM

## 2024-12-10 DIAGNOSIS — G62.0 DRUG-INDUCED POLYNEUROPATHY  (CMD): ICD-10-CM

## 2024-12-10 DIAGNOSIS — M25.512 ACUTE PAIN OF LEFT SHOULDER: ICD-10-CM

## 2024-12-10 DIAGNOSIS — Z00.00 MEDICARE ANNUAL WELLNESS VISIT, SUBSEQUENT: Primary | ICD-10-CM

## 2024-12-10 DIAGNOSIS — E55.9 VITAMIN D INSUFFICIENCY: ICD-10-CM

## 2024-12-10 DIAGNOSIS — G20.A1 PARKINSON'S DISEASE, UNSPECIFIED WHETHER DYSKINESIA PRESENT, UNSPECIFIED WHETHER MANIFESTATIONS FLUCTUATE  (CMD): ICD-10-CM

## 2024-12-10 DIAGNOSIS — E78.5 DYSLIPIDEMIA: ICD-10-CM

## 2024-12-10 DIAGNOSIS — E53.8 VITAMIN B12 DEFICIENCY (NON ANEMIC): ICD-10-CM

## 2024-12-10 ASSESSMENT — ACTIVITIES OF DAILY LIVING (ADL)
NEEDS_ASSIST: NO
RECENT_DECLINE_ADL: NO
SENSORY_SUPPORT_DEVICES: EYEGLASSES
ADL_SHORT_OF_BREATH: NO
ADL_BEFORE_ADMISSION: INDEPENDENT
ADL_SCORE: 12

## 2024-12-10 ASSESSMENT — PAIN SCALES - PAIN ENJOYMENT GENERAL ACTIVITY SCALE (PEG)
INTERFERED_GENERAL_ACTIVITY: 6
INTERFERED_ENJOYMENT_LIFE: 6
AVG_PAIN_PASTWEEK: 5
PEG_TOTALSCORE: 6

## 2024-12-10 ASSESSMENT — COGNITIVE AND FUNCTIONAL STATUS - GENERAL
BECAUSE OF A PHYSICAL, MENTAL, OR EMOTIONAL CONDITION, DO YOU HAVE DIFFICULTY DOING ERRANDS ALONE: NO
BECAUSE OF A PHYSICAL, MENTAL, OR EMOTIONAL CONDITION, DO YOU HAVE SERIOUS DIFFICULTY CONCENTRATING, REMEMBERING OR MAKING DECISIONS: NO
DO YOU HAVE SERIOUS DIFFICULTY WALKING OR CLIMBING STAIRS: NO
DO YOU HAVE DIFFICULTY DRESSING OR BATHING: NO

## 2024-12-10 ASSESSMENT — MINI COG
TOTAL SCORE: 5
PATIENT ABLE TO FILL IN THE CLOCK FACE WITH 10 MINUTES PAST 11 O'CLOCK?: YES, CLOCK IS CORRECT
PATIENT ABLE TO REPEAT THE 3 WORDS GIVEN PREVIOUSLY?: WAS ABLE TO REPEAT BACK 3 WORDS CORRECTLY
PATIENT WAS GIVEN REPEAT BACK WORDS FROM VERSION: 1 - BANANA SUNRISE CHAIR

## 2024-12-10 ASSESSMENT — PAIN SCALES - GENERAL: PAINLEVEL: 5

## 2024-12-11 ENCOUNTER — E-ADVICE (OUTPATIENT)
Dept: INTERNAL MEDICINE | Age: 69
End: 2024-12-11

## 2024-12-11 ENCOUNTER — HOSPITAL ENCOUNTER (OUTPATIENT)
Dept: GENERAL RADIOLOGY | Age: 69
Discharge: HOME | End: 2024-12-11

## 2024-12-11 ENCOUNTER — LAB SERVICES (OUTPATIENT)
Dept: LAB | Age: 69
End: 2024-12-11

## 2024-12-11 DIAGNOSIS — M25.512 ACUTE PAIN OF LEFT SHOULDER: ICD-10-CM

## 2024-12-11 DIAGNOSIS — E53.8 VITAMIN B12 DEFICIENCY (NON ANEMIC): ICD-10-CM

## 2024-12-11 DIAGNOSIS — E78.5 DYSLIPIDEMIA: ICD-10-CM

## 2024-12-11 DIAGNOSIS — R73.01 FASTING HYPERGLYCEMIA: ICD-10-CM

## 2024-12-11 DIAGNOSIS — G62.0 DRUG-INDUCED POLYNEUROPATHY  (CMD): ICD-10-CM

## 2024-12-11 DIAGNOSIS — E55.9 VITAMIN D INSUFFICIENCY: ICD-10-CM

## 2024-12-11 DIAGNOSIS — G20.A1 PARKINSON'S DISEASE, UNSPECIFIED WHETHER DYSKINESIA PRESENT, UNSPECIFIED WHETHER MANIFESTATIONS FLUCTUATE  (CMD): ICD-10-CM

## 2024-12-11 LAB
25(OH)D3+25(OH)D2 SERPL-MCNC: 37 NG/ML (ref 30–100)
ALBUMIN SERPL-MCNC: 4 G/DL (ref 3.4–5)
ALBUMIN/GLOB SERPL: 1.5 {RATIO} (ref 1–2.4)
ALP SERPL-CCNC: 73 UNITS/L (ref 45–117)
ALT SERPL-CCNC: 8 UNITS/L
ANION GAP SERPL CALC-SCNC: 9 MMOL/L (ref 7–19)
AST SERPL-CCNC: 17 UNITS/L
BILIRUB SERPL-MCNC: 0.6 MG/DL (ref 0.2–1)
BUN SERPL-MCNC: 17 MG/DL (ref 6–20)
BUN/CREAT SERPL: 27 (ref 7–25)
CALCIUM SERPL-MCNC: 9.9 MG/DL (ref 8.4–10.2)
CHLORIDE SERPL-SCNC: 102 MMOL/L (ref 97–110)
CHOLEST SERPL-MCNC: 245 MG/DL
CHOLEST/HDLC SERPL: 3.8 {RATIO}
CO2 SERPL-SCNC: 31 MMOL/L (ref 21–32)
CREAT SERPL-MCNC: 0.63 MG/DL (ref 0.51–0.95)
DEPRECATED RDW RBC: 42.6 FL (ref 39–50)
EGFRCR SERPLBLD CKD-EPI 2021: >90 ML/MIN/{1.73_M2}
ERYTHROCYTE [DISTWIDTH] IN BLOOD: 12.5 % (ref 11–15)
FASTING DURATION TIME PATIENT: 12 HOURS (ref 0–999)
FOLATE SERPL-MCNC: 15.4 NG/ML
GLOBULIN SER-MCNC: 2.6 G/DL (ref 2–4)
GLUCOSE SERPL-MCNC: 88 MG/DL (ref 70–99)
HBA1C MFR BLD: 5.1 % (ref 4.5–5.6)
HCT VFR BLD CALC: 43.6 % (ref 36–46.5)
HDLC SERPL-MCNC: 65 MG/DL
HGB BLD-MCNC: 15.2 G/DL (ref 12–15.5)
LDLC SERPL CALC-MCNC: 146 MG/DL
MCH RBC QN AUTO: 32.4 PG (ref 26–34)
MCHC RBC AUTO-ENTMCNC: 34.9 G/DL (ref 32–36.5)
MCV RBC AUTO: 93 FL (ref 78–100)
NONHDLC SERPL-MCNC: 180 MG/DL
NRBC BLD MANUAL-RTO: 0 /100 WBC
PLATELET # BLD AUTO: 291 K/MCL (ref 140–450)
POTASSIUM SERPL-SCNC: 3.8 MMOL/L (ref 3.4–5.1)
PROT SERPL-MCNC: 6.6 G/DL (ref 6.4–8.2)
RBC # BLD: 4.69 MIL/MCL (ref 4–5.2)
SODIUM SERPL-SCNC: 138 MMOL/L (ref 135–145)
TRIGL SERPL-MCNC: 171 MG/DL
TSH SERPL-ACNC: 1.89 MCUNITS/ML (ref 0.35–5)
VIT B12 SERPL-MCNC: 417 PG/ML (ref 211–911)
WBC # BLD: 5.4 K/MCL (ref 4.2–11)

## 2024-12-11 PROCEDURE — 80053 COMPREHEN METABOLIC PANEL: CPT | Performed by: CLINICAL MEDICAL LABORATORY

## 2024-12-11 PROCEDURE — 83036 HEMOGLOBIN GLYCOSYLATED A1C: CPT | Performed by: CLINICAL MEDICAL LABORATORY

## 2024-12-11 PROCEDURE — 82607 VITAMIN B-12: CPT | Performed by: CLINICAL MEDICAL LABORATORY

## 2024-12-11 PROCEDURE — 82306 VITAMIN D 25 HYDROXY: CPT | Performed by: CLINICAL MEDICAL LABORATORY

## 2024-12-11 PROCEDURE — 73030 X-RAY EXAM OF SHOULDER: CPT

## 2024-12-11 PROCEDURE — 36415 COLL VENOUS BLD VENIPUNCTURE: CPT | Performed by: INTERNAL MEDICINE

## 2024-12-11 PROCEDURE — 82172 ASSAY OF APOLIPOPROTEIN: CPT | Performed by: CLINICAL MEDICAL LABORATORY

## 2024-12-11 PROCEDURE — 85027 COMPLETE CBC AUTOMATED: CPT | Performed by: CLINICAL MEDICAL LABORATORY

## 2024-12-11 PROCEDURE — 84443 ASSAY THYROID STIM HORMONE: CPT | Performed by: CLINICAL MEDICAL LABORATORY

## 2024-12-11 PROCEDURE — 83921 ORGANIC ACID SINGLE QUANT: CPT | Performed by: CLINICAL MEDICAL LABORATORY

## 2024-12-11 PROCEDURE — 82746 ASSAY OF FOLIC ACID SERUM: CPT | Performed by: CLINICAL MEDICAL LABORATORY

## 2024-12-11 PROCEDURE — 80061 LIPID PANEL: CPT | Performed by: CLINICAL MEDICAL LABORATORY

## 2024-12-13 LAB
APO A-I SERPL-MCNC: 175 MG/DL (ref 108–225)
APO B/APO A-I SERPL: 0.7 {RATIO}
APO B100 SERPL-MCNC: 122 MG/DL (ref 60–117)

## 2024-12-14 LAB — METHYLMALONATE SERPL-SCNC: 0.16 UMOL/L (ref 0–0.4)

## 2025-01-17 ENCOUNTER — WALK IN (OUTPATIENT)
Dept: URGENT CARE | Age: 70
End: 2025-01-17
Attending: NURSE PRACTITIONER

## 2025-01-17 VITALS
HEART RATE: 86 BPM | TEMPERATURE: 98.6 F | SYSTOLIC BLOOD PRESSURE: 150 MMHG | OXYGEN SATURATION: 98 % | RESPIRATION RATE: 20 BRPM | DIASTOLIC BLOOD PRESSURE: 92 MMHG

## 2025-01-17 DIAGNOSIS — R09.81 SINUS CONGESTION: Primary | ICD-10-CM

## 2025-01-17 RX ORDER — METHYLPREDNISOLONE 4 MG/1
4 TABLET ORAL SEE ADMIN INSTRUCTIONS
Qty: 21 TABLET | Refills: 0 | Status: SHIPPED | OUTPATIENT
Start: 2025-01-17

## 2025-01-17 ASSESSMENT — PAIN SCALES - GENERAL: PAINLEVEL: 0

## 2025-01-21 ENCOUNTER — APPOINTMENT (OUTPATIENT)
Dept: GASTROENTEROLOGY | Age: 70
End: 2025-01-21

## 2025-01-27 ENCOUNTER — OFFICE VISIT (OUTPATIENT)
Dept: INTERNAL MEDICINE | Age: 70
End: 2025-01-27

## 2025-01-27 ENCOUNTER — TELEPHONE (OUTPATIENT)
Dept: INTERNAL MEDICINE | Age: 70
End: 2025-01-27

## 2025-01-27 VITALS
WEIGHT: 134.7 LBS | RESPIRATION RATE: 16 BRPM | DIASTOLIC BLOOD PRESSURE: 80 MMHG | TEMPERATURE: 97.8 F | HEART RATE: 80 BPM | OXYGEN SATURATION: 99 % | SYSTOLIC BLOOD PRESSURE: 120 MMHG | HEIGHT: 65 IN | BODY MASS INDEX: 22.44 KG/M2

## 2025-01-27 DIAGNOSIS — J01.10 ACUTE NON-RECURRENT FRONTAL SINUSITIS: Primary | ICD-10-CM

## 2025-01-27 DIAGNOSIS — R09.82 POST-NASAL DRIP: ICD-10-CM

## 2025-01-27 RX ORDER — GUAIFENESIN 600 MG/1
600 TABLET, EXTENDED RELEASE ORAL 2 TIMES DAILY PRN
COMMUNITY
Start: 2025-01-27

## 2025-01-27 RX ORDER — AZITHROMYCIN 250 MG/1
TABLET, FILM COATED ORAL
Qty: 6 TABLET | Refills: 0 | Status: SHIPPED | OUTPATIENT
Start: 2025-01-27 | End: 2025-02-01

## 2025-01-27 RX ORDER — ERYTHROMYCIN 5 MG/G
OINTMENT OPHTHALMIC
COMMUNITY
Start: 2025-01-16

## 2025-01-27 RX ORDER — TRAMADOL HYDROCHLORIDE 50 MG/1
50 TABLET ORAL EVERY 4 HOURS PRN
COMMUNITY
Start: 2025-01-16

## 2025-01-27 ASSESSMENT — PAIN SCALES - GENERAL: PAINLEVEL: 0

## 2025-01-27 ASSESSMENT — PATIENT HEALTH QUESTIONNAIRE - PHQ9
CLINICAL INTERPRETATION OF PHQ2 SCORE: NO FURTHER SCREENING NEEDED
2. FEELING DOWN, DEPRESSED OR HOPELESS: NOT AT ALL
SUM OF ALL RESPONSES TO PHQ9 QUESTIONS 1 AND 2: 0
SUM OF ALL RESPONSES TO PHQ9 QUESTIONS 1 AND 2: 0
1. LITTLE INTEREST OR PLEASURE IN DOING THINGS: NOT AT ALL

## 2025-01-27 ASSESSMENT — VISUAL ACUITY
OU: 1
OU: 1

## 2025-01-27 ASSESSMENT — ENCOUNTER SYMPTOMS
COUGH: 1
EYES NEGATIVE: 1
GASTROINTESTINAL NEGATIVE: 1
GASTROINTESTINAL NEGATIVE: 1
CONSTITUTIONAL NEGATIVE: 1
PSYCHIATRIC NEGATIVE: 1
PSYCHIATRIC NEGATIVE: 1
NEUROLOGICAL NEGATIVE: 1
SINUS PAIN: 1
CONSTITUTIONAL NEGATIVE: 1
SINUS PAIN: 1
EYES NEGATIVE: 1
COUGH: 1
NEUROLOGICAL NEGATIVE: 1

## 2025-01-28 LAB
FLUAV RNA RESP QL NAA+PROBE: NOT DETECTED
FLUBV RNA RESP QL NAA+PROBE: NOT DETECTED
RSV AG NPH QL IA.RAPID: NOT DETECTED
SARS-COV-2 RNA RESP QL NAA+PROBE: NOT DETECTED
SERVICE CMNT-IMP: NORMAL
SERVICE CMNT-IMP: NORMAL

## 2025-02-07 ENCOUNTER — APPOINTMENT (OUTPATIENT)
Dept: INTERNAL MEDICINE | Age: 70
End: 2025-02-07

## 2025-03-20 ENCOUNTER — APPOINTMENT (OUTPATIENT)
Dept: GASTROENTEROLOGY | Age: 70
End: 2025-03-20

## 2025-03-20 VITALS
HEIGHT: 65 IN | DIASTOLIC BLOOD PRESSURE: 66 MMHG | SYSTOLIC BLOOD PRESSURE: 129 MMHG | BODY MASS INDEX: 22.49 KG/M2 | HEART RATE: 81 BPM | WEIGHT: 135 LBS

## 2025-03-20 DIAGNOSIS — K21.9 GASTROESOPHAGEAL REFLUX DISEASE WITHOUT ESOPHAGITIS: ICD-10-CM

## 2025-03-20 RX ORDER — CARBIDOPA AND LEVODOPA 70; 280 MG/1; MG/1
CAPSULE, EXTENDED RELEASE ORAL DAILY
COMMUNITY
Start: 2025-03-17

## 2025-03-20 RX ORDER — OMEPRAZOLE 40 MG/1
40 CAPSULE, DELAYED RELEASE ORAL DAILY
Qty: 90 CAPSULE | Refills: 3 | Status: SHIPPED | OUTPATIENT
Start: 2025-03-20

## 2025-04-04 DIAGNOSIS — M85.80 OSTEOPENIA: Primary | ICD-10-CM

## 2025-04-04 DIAGNOSIS — Z78.0 MENOPAUSE: ICD-10-CM

## 2025-04-21 ENCOUNTER — TELEPHONE (OUTPATIENT)
Dept: INTERNAL MEDICINE | Age: 70
End: 2025-04-21

## 2025-04-21 DIAGNOSIS — I89.0 LYMPHEDEMA: Primary | ICD-10-CM

## 2025-04-22 ENCOUNTER — HOSPITAL ENCOUNTER (OUTPATIENT)
Dept: MAMMOGRAPHY | Age: 70
Discharge: HOME | End: 2025-04-22
Attending: INTERNAL MEDICINE

## 2025-04-22 DIAGNOSIS — Z78.0 MENOPAUSE: ICD-10-CM

## 2025-04-22 DIAGNOSIS — M85.80 OSTEOPENIA: ICD-10-CM

## 2025-04-22 PROCEDURE — 77080 DXA BONE DENSITY AXIAL: CPT

## 2025-04-23 LAB
DEXA FRACTURE RISK HIP: NORMAL
DEXA FRACTURE RISK MAJOR: NORMAL
DEXA LT FEMNECK TSCORE: -2.3
DEXA LT FEMNECK ZSCORE: -0.4
DEXA LT HIP FRAX: NORMAL
DEXA LT MAJOR OSTEO FRAX: NORMAL
DEXA LT TOTFEM TSCORE: -1.6
DEXA SPINE L1-L4 T-SCORE: -1.7
DEXA SPINE L1-L4 Z-SCORE: 0.5

## 2025-04-30 DIAGNOSIS — J32.9 CHRONIC SINUSITIS: Primary | ICD-10-CM

## 2025-05-02 ENCOUNTER — HOSPITAL ENCOUNTER (OUTPATIENT)
Dept: CT IMAGING | Age: 70
Discharge: HOME | End: 2025-05-02
Attending: OTOLARYNGOLOGY

## 2025-05-02 DIAGNOSIS — J32.9 CHRONIC SINUSITIS: ICD-10-CM

## 2025-05-02 PROCEDURE — 70486 CT MAXILLOFACIAL W/O DYE: CPT

## 2025-05-07 RX ORDER — IPRATROPIUM BROMIDE 42 UG/1
SPRAY, METERED NASAL
COMMUNITY
Start: 2025-03-06 | End: 2025-05-22 | Stop reason: ALTCHOICE

## 2025-05-07 RX ORDER — ALBUTEROL SULFATE 90 UG/1
INHALANT RESPIRATORY (INHALATION)
COMMUNITY
Start: 2025-03-06

## 2025-05-08 ENCOUNTER — TELEPHONE (OUTPATIENT)
Dept: INTERNAL MEDICINE | Age: 70
End: 2025-05-08

## 2025-05-09 ENCOUNTER — TELEPHONE (OUTPATIENT)
Dept: INTERNAL MEDICINE | Age: 70
End: 2025-05-09

## 2025-05-21 ENCOUNTER — CLINICAL ABSTRACT (OUTPATIENT)
Dept: HEALTH INFORMATION MANAGEMENT | Facility: OTHER | Age: 70
End: 2025-05-21

## 2025-05-21 ENCOUNTER — OFFICE VISIT (OUTPATIENT)
Dept: INTERNAL MEDICINE | Age: 70
End: 2025-05-21

## 2025-05-21 VITALS
SYSTOLIC BLOOD PRESSURE: 130 MMHG | RESPIRATION RATE: 14 BRPM | BODY MASS INDEX: 22.16 KG/M2 | HEART RATE: 89 BPM | HEIGHT: 65 IN | OXYGEN SATURATION: 96 % | TEMPERATURE: 97.3 F | WEIGHT: 133 LBS | DIASTOLIC BLOOD PRESSURE: 78 MMHG

## 2025-05-21 DIAGNOSIS — J32.4 CHRONIC PANSINUSITIS: Primary | ICD-10-CM

## 2025-05-21 DIAGNOSIS — J34.2 DEVIATED NASAL SEPTUM: ICD-10-CM

## 2025-05-21 DIAGNOSIS — G20.A1 PARKINSON'S DISEASE, UNSPECIFIED WHETHER DYSKINESIA PRESENT, UNSPECIFIED WHETHER MANIFESTATIONS FLUCTUATE  (CMD): ICD-10-CM

## 2025-05-21 DIAGNOSIS — Z01.818 PRE-OPERATIVE EXAMINATION FOR INTERNAL MEDICINE: ICD-10-CM

## 2025-05-21 RX ORDER — CEFDINIR 300 MG/1
300 CAPSULE ORAL 2 TIMES DAILY
COMMUNITY
Start: 2025-05-02 | End: 2025-05-21 | Stop reason: ALTCHOICE

## 2025-05-21 RX ORDER — PREDNISONE 20 MG/1
TABLET ORAL
COMMUNITY
Start: 2025-05-07 | End: 2025-05-21 | Stop reason: ALTCHOICE

## 2025-05-21 SDOH — ECONOMIC STABILITY: FOOD INSECURITY: WITHIN THE PAST 12 MONTHS, THE FOOD YOU BOUGHT JUST DIDN'T LAST AND YOU DIDN'T HAVE MONEY TO GET MORE.: NEVER TRUE

## 2025-05-21 SDOH — ECONOMIC STABILITY: HOUSING INSECURITY: WHAT IS YOUR LIVING SITUATION TODAY?: I HAVE A STEADY PLACE TO LIVE

## 2025-05-21 SDOH — ECONOMIC STABILITY: HOUSING INSECURITY: DO YOU HAVE PROBLEMS WITH ANY OF THE FOLLOWING?: NONE OF THE ABOVE

## 2025-05-21 ASSESSMENT — PAIN SCALES - GENERAL: PAINLEVEL_OUTOF10: 5

## 2025-05-21 ASSESSMENT — PATIENT HEALTH QUESTIONNAIRE - PHQ9
CLINICAL INTERPRETATION OF PHQ2 SCORE: NO FURTHER SCREENING NEEDED
SUM OF ALL RESPONSES TO PHQ9 QUESTIONS 1 AND 2: 0
SUM OF ALL RESPONSES TO PHQ9 QUESTIONS 1 AND 2: 0
2. FEELING DOWN, DEPRESSED OR HOPELESS: NOT AT ALL
1. LITTLE INTEREST OR PLEASURE IN DOING THINGS: NOT AT ALL

## 2025-05-21 ASSESSMENT — SOCIAL DETERMINANTS OF HEALTH (SDOH): IN THE PAST 12 MONTHS, HAS THE ELECTRIC, GAS, OIL, OR WATER COMPANY THREATENED TO SHUT OFF SERVICE IN YOUR HOME?: NO

## 2025-05-22 PROBLEM — G20.A1 PARKINSON'S DISEASE (CMD): Status: RESOLVED | Noted: 2023-04-04 | Resolved: 2025-05-22

## 2025-05-22 LAB
ALBUMIN SERPL-MCNC: 3.8 G/DL (ref 3.4–5)
ALBUMIN/GLOB SERPL: 1.6 {RATIO} (ref 1–2.4)
ALP SERPL-CCNC: 85 UNITS/L (ref 45–117)
ALT SERPL-CCNC: 18 UNITS/L
ANION GAP SERPL CALC-SCNC: 8 MMOL/L (ref 7–19)
AST SERPL-CCNC: 21 UNITS/L
BASOPHILS # BLD: 0.1 K/MCL (ref 0–0.3)
BASOPHILS NFR BLD: 1 %
BILIRUB SERPL-MCNC: 0.5 MG/DL (ref 0.2–1)
BUN SERPL-MCNC: 16 MG/DL (ref 6–20)
BUN/CREAT SERPL: 26 (ref 7–25)
CALCIUM SERPL-MCNC: 9.6 MG/DL (ref 8.4–10.2)
CHLORIDE SERPL-SCNC: 107 MMOL/L (ref 97–110)
CO2 SERPL-SCNC: 27 MMOL/L (ref 21–32)
CREAT SERPL-MCNC: 0.62 MG/DL (ref 0.51–0.95)
DEPRECATED RDW RBC: 43.9 FL (ref 39–50)
EGFRCR SERPLBLD CKD-EPI 2021: >90 ML/MIN/{1.73_M2}
EOSINOPHIL # BLD: 0.2 K/MCL (ref 0–0.5)
EOSINOPHIL NFR BLD: 3 %
ERYTHROCYTE [DISTWIDTH] IN BLOOD: 12.9 % (ref 11–15)
FASTING DURATION TIME PATIENT: ABNORMAL H
GLOBULIN SER-MCNC: 2.4 G/DL (ref 2–4)
GLUCOSE SERPL-MCNC: 128 MG/DL (ref 70–99)
HCT VFR BLD CALC: 40.3 % (ref 36–46.5)
HGB BLD-MCNC: 13.6 G/DL (ref 12–15.5)
IMM GRANULOCYTES # BLD AUTO: 0 K/MCL (ref 0–0.2)
IMM GRANULOCYTES # BLD: 0 %
LYMPHOCYTES # BLD: 1.3 K/MCL (ref 1–4)
LYMPHOCYTES NFR BLD: 20 %
MCH RBC QN AUTO: 31.5 PG (ref 26–34)
MCHC RBC AUTO-ENTMCNC: 33.7 G/DL (ref 32–36.5)
MCV RBC AUTO: 93.3 FL (ref 78–100)
MONOCYTES # BLD: 0.5 K/MCL (ref 0.3–0.9)
MONOCYTES NFR BLD: 8 %
NEUTROPHILS # BLD: 4.4 K/MCL (ref 1.8–7.7)
NEUTROPHILS NFR BLD: 68 %
NRBC BLD MANUAL-RTO: 0 /100 WBC
PLATELET # BLD AUTO: 243 K/MCL (ref 140–450)
POTASSIUM SERPL-SCNC: 3.4 MMOL/L (ref 3.4–5.1)
PROT SERPL-MCNC: 6.2 G/DL (ref 6.4–8.2)
RBC # BLD: 4.32 MIL/MCL (ref 4–5.2)
SODIUM SERPL-SCNC: 139 MMOL/L (ref 135–145)
WBC # BLD: 6.4 K/MCL (ref 4.2–11)

## 2025-05-22 SDOH — SOCIAL STABILITY: SOCIAL INSECURITY: HOW OFTEN DOES ANYONE, INCLUDING FAMILY AND FRIENDS, SCREAM OR CURSE AT YOU?: NEVER

## 2025-05-22 SDOH — SOCIAL STABILITY: SOCIAL INSECURITY: HOW OFTEN DOES ANYONE, INCLUDING FAMILY AND FRIENDS, PHYSICALLY HURT YOU?: NEVER

## 2025-05-22 SDOH — SOCIAL STABILITY: SOCIAL INSECURITY: HOW OFTEN DOES ANYONE, INCLUDING FAMILY AND FRIENDS, THREATEN YOU WITH HARM?: NEVER

## 2025-05-22 SDOH — SOCIAL STABILITY: SOCIAL INSECURITY: HOW OFTEN DOES ANYONE, INCLUDING FAMILY AND FRIENDS, INSULT OR TALK DOWN TO YOU?: NEVER

## 2025-05-26 ENCOUNTER — RESULTS FOLLOW-UP (OUTPATIENT)
Dept: INTERNAL MEDICINE | Age: 70
End: 2025-05-26

## 2025-05-26 DIAGNOSIS — R73.01 FASTING HYPERGLYCEMIA: Primary | ICD-10-CM

## 2025-05-27 ENCOUNTER — E-ADVICE (OUTPATIENT)
Dept: INTERNAL MEDICINE | Age: 70
End: 2025-05-27

## 2025-05-27 LAB — HBA1C MFR BLD: 5.6 % (ref 4.5–5.6)

## 2025-05-28 ENCOUNTER — LAB REQUISITION (OUTPATIENT)
Dept: LAB | Age: 70
End: 2025-05-28

## 2025-05-28 DIAGNOSIS — M85.80 OTHER SPECIFIED DISORDERS OF BONE DENSITY AND STRUCTURE, UNSPECIFIED SITE: ICD-10-CM

## 2025-05-28 DIAGNOSIS — E87.6 HYPOKALEMIA: ICD-10-CM

## 2025-05-28 DIAGNOSIS — M81.0 AGE-RELATED OSTEOPOROSIS WITHOUT CURRENT PATHOLOGICAL FRACTURE: ICD-10-CM

## 2025-06-05 ENCOUNTER — ANESTHESIA (OUTPATIENT)
Dept: SURGERY | Age: 70
End: 2025-06-05

## 2025-06-05 ENCOUNTER — ANESTHESIA EVENT (OUTPATIENT)
Dept: SURGERY | Age: 70
End: 2025-06-05

## 2025-06-05 ENCOUNTER — HOSPITAL ENCOUNTER (OUTPATIENT)
Age: 70
Discharge: HOME | End: 2025-06-05
Attending: OTOLARYNGOLOGY | Admitting: OTOLARYNGOLOGY

## 2025-06-05 VITALS
HEART RATE: 94 BPM | SYSTOLIC BLOOD PRESSURE: 158 MMHG | BODY MASS INDEX: 22.16 KG/M2 | RESPIRATION RATE: 16 BRPM | DIASTOLIC BLOOD PRESSURE: 76 MMHG | HEIGHT: 65 IN | TEMPERATURE: 97.3 F | WEIGHT: 133 LBS | OXYGEN SATURATION: 94 %

## 2025-06-05 DIAGNOSIS — J32.4 CHRONIC PANSINUSITIS: Primary | ICD-10-CM

## 2025-06-05 PROCEDURE — 10006027 HB SUPPLY 278: Performed by: OTOLARYNGOLOGY

## 2025-06-05 PROCEDURE — 88305 TISSUE EXAM BY PATHOLOGIST: CPT | Performed by: OTOLARYNGOLOGY

## 2025-06-05 PROCEDURE — 13000003 HB ANESTHESIA  GENERAL EA ADD MINUTE: Performed by: OTOLARYNGOLOGY

## 2025-06-05 PROCEDURE — 10004452 HB PACU ADDL 30 MINUTES: Performed by: OTOLARYNGOLOGY

## 2025-06-05 PROCEDURE — 13000001 HB PHASE II RECOVERY EA 30 MINUTES: Performed by: OTOLARYNGOLOGY

## 2025-06-05 PROCEDURE — 10002803 HB RX 637: Performed by: OTOLARYNGOLOGY

## 2025-06-05 PROCEDURE — C2625 STENT, NON-COR, TEM W/DEL SY: HCPCS | Performed by: OTOLARYNGOLOGY

## 2025-06-05 PROCEDURE — 13000038 HB MAJOR COMPLEX CASE S/U + 1ST 15 MIN: Performed by: OTOLARYNGOLOGY

## 2025-06-05 PROCEDURE — 10002807 HB RX 258: Performed by: STUDENT IN AN ORGANIZED HEALTH CARE EDUCATION/TRAINING PROGRAM

## 2025-06-05 PROCEDURE — 10002801 HB RX 250 W/O HCPCS: Performed by: STUDENT IN AN ORGANIZED HEALTH CARE EDUCATION/TRAINING PROGRAM

## 2025-06-05 PROCEDURE — 10002800 HB RX 250 W HCPCS: Performed by: STUDENT IN AN ORGANIZED HEALTH CARE EDUCATION/TRAINING PROGRAM

## 2025-06-05 PROCEDURE — 10006023 HB SUPPLY 272: Performed by: OTOLARYNGOLOGY

## 2025-06-05 PROCEDURE — 13000002 HB ANESTHESIA  GENERAL   S/U + 1ST 15 MIN: Performed by: OTOLARYNGOLOGY

## 2025-06-05 PROCEDURE — 13000039 HB MAJOR COMPLEX CASE EA ADD MINUTE: Performed by: OTOLARYNGOLOGY

## 2025-06-05 PROCEDURE — 10004451 HB PACU RECOVERY 1ST 30 MINUTES: Performed by: OTOLARYNGOLOGY

## 2025-06-05 PROCEDURE — 10002801 HB RX 250 W/O HCPCS: Performed by: OTOLARYNGOLOGY

## 2025-06-05 PROCEDURE — 10002803 HB RX 637: Performed by: STUDENT IN AN ORGANIZED HEALTH CARE EDUCATION/TRAINING PROGRAM

## 2025-06-05 PROCEDURE — 10002800 HB RX 250 W HCPCS: Performed by: OTOLARYNGOLOGY

## 2025-06-05 DEVICE — IMPLANT 70011 PROPEL
Type: IMPLANTABLE DEVICE | Site: NASAL SINUS | Status: FUNCTIONAL
Brand: PROPEL

## 2025-06-05 DEVICE — KIT TISS CLSR 4ML PREFL SYRINGE FRZN COMBI SET PRIMA: Type: IMPLANTABLE DEVICE | Site: NASAL SINUS | Status: FUNCTIONAL

## 2025-06-05 RX ORDER — METOPROLOL SUCCINATE 25 MG/1
25 TABLET, EXTENDED RELEASE ORAL
Status: DISCONTINUED | OUTPATIENT
Start: 2025-06-05 | End: 2025-06-05 | Stop reason: HOSPADM

## 2025-06-05 RX ORDER — LIDOCAINE HYDROCHLORIDE AND EPINEPHRINE 10; 10 MG/ML; UG/ML
INJECTION, SOLUTION INFILTRATION; PERINEURAL PRN
Status: DISCONTINUED | OUTPATIENT
Start: 2025-06-05 | End: 2025-06-05 | Stop reason: HOSPADM

## 2025-06-05 RX ORDER — DIPHENHYDRAMINE HYDROCHLORIDE 50 MG/ML
25 INJECTION, SOLUTION INTRAMUSCULAR; INTRAVENOUS EVERY 4 HOURS PRN
Status: DISCONTINUED | OUTPATIENT
Start: 2025-06-05 | End: 2025-06-05 | Stop reason: HOSPADM

## 2025-06-05 RX ORDER — ONDANSETRON 2 MG/ML
4 INJECTION INTRAMUSCULAR; INTRAVENOUS
Status: DISCONTINUED | OUTPATIENT
Start: 2025-06-05 | End: 2025-06-05 | Stop reason: HOSPADM

## 2025-06-05 RX ORDER — DEXTROSE MONOHYDRATE 25 G/50ML
25 INJECTION, SOLUTION INTRAVENOUS PRN
Status: DISCONTINUED | OUTPATIENT
Start: 2025-06-05 | End: 2025-06-05 | Stop reason: HOSPADM

## 2025-06-05 RX ORDER — ACETAMINOPHEN 325 MG/1
650 TABLET ORAL EVERY 4 HOURS PRN
Status: DISCONTINUED | OUTPATIENT
Start: 2025-06-05 | End: 2025-06-05 | Stop reason: HOSPADM

## 2025-06-05 RX ORDER — 0.9 % SODIUM CHLORIDE 0.9 %
2 VIAL (ML) INJECTION EVERY 12 HOURS SCHEDULED
Status: DISCONTINUED | OUTPATIENT
Start: 2025-06-05 | End: 2025-06-05 | Stop reason: HOSPADM

## 2025-06-05 RX ORDER — ONDANSETRON 2 MG/ML
INJECTION INTRAMUSCULAR; INTRAVENOUS PRN
Status: DISCONTINUED | OUTPATIENT
Start: 2025-06-05 | End: 2025-06-05

## 2025-06-05 RX ORDER — ALPRAZOLAM 0.25 MG
0.25 TABLET ORAL DAILY PRN
Qty: 10 TABLET | Refills: 0 | Status: SHIPPED | OUTPATIENT
Start: 2025-06-05

## 2025-06-05 RX ORDER — ROCURONIUM BROMIDE 10 MG/ML
INJECTION, SOLUTION INTRAVENOUS PRN
Status: DISCONTINUED | OUTPATIENT
Start: 2025-06-05 | End: 2025-06-05

## 2025-06-05 RX ORDER — HYDROCODONE BITARTRATE AND ACETAMINOPHEN 5; 325 MG/1; MG/1
1 TABLET ORAL
Refills: 0 | Status: COMPLETED | OUTPATIENT
Start: 2025-06-05 | End: 2025-06-05

## 2025-06-05 RX ORDER — DEXAMETHASONE SODIUM PHOSPHATE 4 MG/ML
4 INJECTION, SOLUTION INTRA-ARTICULAR; INTRALESIONAL; INTRAMUSCULAR; INTRAVENOUS; SOFT TISSUE
Status: DISCONTINUED | OUTPATIENT
Start: 2025-06-05 | End: 2025-06-05 | Stop reason: HOSPADM

## 2025-06-05 RX ORDER — MIDAZOLAM HYDROCHLORIDE 1 MG/ML
2 INJECTION, SOLUTION INTRAMUSCULAR; INTRAVENOUS
Status: DISCONTINUED | OUTPATIENT
Start: 2025-06-05 | End: 2025-06-05 | Stop reason: HOSPADM

## 2025-06-05 RX ORDER — SODIUM CHLORIDE, SODIUM LACTATE, POTASSIUM CHLORIDE, CALCIUM CHLORIDE 600; 310; 30; 20 MG/100ML; MG/100ML; MG/100ML; MG/100ML
INJECTION, SOLUTION INTRAVENOUS CONTINUOUS PRN
Status: DISCONTINUED | OUTPATIENT
Start: 2025-06-05 | End: 2025-06-05

## 2025-06-05 RX ORDER — 0.9 % SODIUM CHLORIDE 0.9 %
10 VIAL (ML) INJECTION PRN
Status: DISCONTINUED | OUTPATIENT
Start: 2025-06-05 | End: 2025-06-05 | Stop reason: HOSPADM

## 2025-06-05 RX ORDER — LIDOCAINE HYDROCHLORIDE 10 MG/ML
INJECTION, SOLUTION INFILTRATION; PERINEURAL PRN
Status: DISCONTINUED | OUTPATIENT
Start: 2025-06-05 | End: 2025-06-05

## 2025-06-05 RX ORDER — EPHEDRINE SULFATE 50 MG/ML
INJECTION, SOLUTION INTRAVENOUS PRN
Status: DISCONTINUED | OUTPATIENT
Start: 2025-06-05 | End: 2025-06-05

## 2025-06-05 RX ORDER — DIPHENHYDRAMINE HYDROCHLORIDE 50 MG/ML
12.5 INJECTION, SOLUTION INTRAMUSCULAR; INTRAVENOUS
Status: DISCONTINUED | OUTPATIENT
Start: 2025-06-05 | End: 2025-06-05 | Stop reason: HOSPADM

## 2025-06-05 RX ORDER — DEXAMETHASONE SODIUM PHOSPHATE 10 MG/ML
INJECTION, SOLUTION INTRAMUSCULAR; INTRAVENOUS PRN
Status: DISCONTINUED | OUTPATIENT
Start: 2025-06-05 | End: 2025-06-05

## 2025-06-05 RX ORDER — HYDROCODONE BITARTRATE AND ACETAMINOPHEN 5; 325 MG/1; MG/1
1 TABLET ORAL EVERY 6 HOURS PRN
Qty: 12 TABLET | Refills: 0 | Status: SHIPPED | OUTPATIENT
Start: 2025-06-05

## 2025-06-05 RX ORDER — SODIUM CHLORIDE, SODIUM LACTATE, POTASSIUM CHLORIDE, CALCIUM CHLORIDE 600; 310; 30; 20 MG/100ML; MG/100ML; MG/100ML; MG/100ML
INJECTION, SOLUTION INTRAVENOUS CONTINUOUS
Status: DISCONTINUED | OUTPATIENT
Start: 2025-06-05 | End: 2025-06-05 | Stop reason: HOSPADM

## 2025-06-05 RX ORDER — LIDOCAINE HYDROCHLORIDE 10 MG/ML
5 INJECTION, SOLUTION EPIDURAL; INFILTRATION; INTRACAUDAL; PERINEURAL PRN
Status: DISCONTINUED | OUTPATIENT
Start: 2025-06-05 | End: 2025-06-05 | Stop reason: HOSPADM

## 2025-06-05 RX ORDER — NICOTINE POLACRILEX 4 MG
30 LOZENGE BUCCAL
Status: DISCONTINUED | OUTPATIENT
Start: 2025-06-05 | End: 2025-06-05 | Stop reason: HOSPADM

## 2025-06-05 RX ORDER — IPRATROPIUM BROMIDE AND ALBUTEROL SULFATE 2.5; .5 MG/3ML; MG/3ML
3 SOLUTION RESPIRATORY (INHALATION)
Status: DISCONTINUED | OUTPATIENT
Start: 2025-06-05 | End: 2025-06-05 | Stop reason: HOSPADM

## 2025-06-05 RX ORDER — SODIUM CHLORIDE 9 MG/ML
INJECTION, SOLUTION INTRAVENOUS CONTINUOUS
Status: DISCONTINUED | OUTPATIENT
Start: 2025-06-05 | End: 2025-06-05 | Stop reason: HOSPADM

## 2025-06-05 RX ORDER — DROPERIDOL 2.5 MG/ML
0.62 INJECTION, SOLUTION INTRAMUSCULAR; INTRAVENOUS
Status: DISCONTINUED | OUTPATIENT
Start: 2025-06-05 | End: 2025-06-05 | Stop reason: HOSPADM

## 2025-06-05 RX ORDER — HYDRALAZINE HYDROCHLORIDE 20 MG/ML
5 INJECTION INTRAMUSCULAR; INTRAVENOUS EVERY 10 MIN PRN
Status: DISCONTINUED | OUTPATIENT
Start: 2025-06-05 | End: 2025-06-05 | Stop reason: HOSPADM

## 2025-06-05 RX ORDER — ONDANSETRON 2 MG/ML
4 INJECTION INTRAMUSCULAR; INTRAVENOUS 2 TIMES DAILY PRN
Status: DISCONTINUED | OUTPATIENT
Start: 2025-06-05 | End: 2025-06-05 | Stop reason: HOSPADM

## 2025-06-05 RX ORDER — PROPOFOL 10 MG/ML
INJECTION, EMULSION INTRAVENOUS PRN
Status: DISCONTINUED | OUTPATIENT
Start: 2025-06-05 | End: 2025-06-05

## 2025-06-05 RX ORDER — SCOPOLAMINE 1 MG/3D
1 PATCH, EXTENDED RELEASE TRANSDERMAL
Status: DISCONTINUED | OUTPATIENT
Start: 2025-06-05 | End: 2025-06-05 | Stop reason: HOSPADM

## 2025-06-05 RX ORDER — ACETAMINOPHEN 325 MG/1
650 TABLET ORAL
Status: DISCONTINUED | OUTPATIENT
Start: 2025-06-05 | End: 2025-06-05 | Stop reason: HOSPADM

## 2025-06-05 RX ORDER — NEOSTIGMINE METHYLSULFATE 4 MG/4 ML
SYRINGE (ML) INTRAVENOUS PRN
Status: DISCONTINUED | OUTPATIENT
Start: 2025-06-05 | End: 2025-06-05

## 2025-06-05 RX ORDER — ACETAMINOPHEN 650 MG/1
650 SUPPOSITORY RECTAL EVERY 4 HOURS PRN
Status: DISCONTINUED | OUTPATIENT
Start: 2025-06-05 | End: 2025-06-05 | Stop reason: HOSPADM

## 2025-06-05 RX ORDER — CEFDINIR 300 MG/1
300 CAPSULE ORAL 2 TIMES DAILY
Qty: 20 CAPSULE | Refills: 0 | Status: SHIPPED | OUTPATIENT
Start: 2025-06-05 | End: 2025-06-15

## 2025-06-05 RX ORDER — DIPHENHYDRAMINE HYDROCHLORIDE 50 MG/ML
12.5 INJECTION, SOLUTION INTRAMUSCULAR; INTRAVENOUS EVERY 4 HOURS PRN
Status: DISCONTINUED | OUTPATIENT
Start: 2025-06-05 | End: 2025-06-05 | Stop reason: HOSPADM

## 2025-06-05 RX ORDER — CHLORHEXIDINE GLUCONATE ORAL RINSE 1.2 MG/ML
15 SOLUTION DENTAL
Status: COMPLETED | OUTPATIENT
Start: 2025-06-05 | End: 2025-06-05

## 2025-06-05 RX ORDER — DEXTROSE MONOHYDRATE 50 MG/ML
INJECTION, SOLUTION INTRAVENOUS CONTINUOUS PRN
Status: DISCONTINUED | OUTPATIENT
Start: 2025-06-05 | End: 2025-06-05 | Stop reason: HOSPADM

## 2025-06-05 RX ORDER — GLYCOPYRROLATE 0.2 MG/ML
INJECTION, SOLUTION INTRAMUSCULAR; INTRAVENOUS PRN
Status: DISCONTINUED | OUTPATIENT
Start: 2025-06-05 | End: 2025-06-05

## 2025-06-05 RX ADMIN — EPHEDRINE SULFATE 5 MG: 50 INJECTION, SOLUTION INTRAVENOUS at 13:11

## 2025-06-05 RX ADMIN — NEOSTIGMINE METHYLSULFATE 5 MG: 1 INJECTION, SOLUTION INTRAVENOUS at 13:21

## 2025-06-05 RX ADMIN — GLYCOPYRROLATE 0.8 MG: 0.2 INJECTION INTRAMUSCULAR; INTRAVENOUS at 13:21

## 2025-06-05 RX ADMIN — ONDANSETRON 4 MG: 2 INJECTION INTRAMUSCULAR; INTRAVENOUS at 13:19

## 2025-06-05 RX ADMIN — HYDROCODONE BITARTRATE AND ACETAMINOPHEN 1 TABLET: 5; 325 TABLET ORAL at 13:52

## 2025-06-05 RX ADMIN — ROCURONIUM BROMIDE 50 MG: 10 INJECTION INTRAVENOUS at 12:08

## 2025-06-05 RX ADMIN — SODIUM CHLORIDE, POTASSIUM CHLORIDE, SODIUM LACTATE AND CALCIUM CHLORIDE: 600; 310; 30; 20 INJECTION, SOLUTION INTRAVENOUS at 12:03

## 2025-06-05 RX ADMIN — EPHEDRINE SULFATE 5 MG: 50 INJECTION, SOLUTION INTRAVENOUS at 12:21

## 2025-06-05 RX ADMIN — FENTANYL CITRATE 50 MCG: 50 INJECTION INTRAMUSCULAR; INTRAVENOUS at 12:27

## 2025-06-05 RX ADMIN — LIDOCAINE HYDROCHLORIDE 50 MG: 10 INJECTION, SOLUTION INFILTRATION; PERINEURAL at 12:08

## 2025-06-05 RX ADMIN — FENTANYL CITRATE 50 MCG: 50 INJECTION INTRAMUSCULAR; INTRAVENOUS at 12:48

## 2025-06-05 RX ADMIN — SCOPOLAMINE 1 PATCH: 1.5 PATCH, EXTENDED RELEASE TRANSDERMAL at 12:00

## 2025-06-05 RX ADMIN — EPHEDRINE SULFATE 10 MG: 50 INJECTION, SOLUTION INTRAVENOUS at 13:00

## 2025-06-05 RX ADMIN — FENTANYL CITRATE 50 MCG: 50 INJECTION INTRAMUSCULAR; INTRAVENOUS at 12:08

## 2025-06-05 RX ADMIN — DEXAMETHASONE SODIUM PHOSPHATE 8 MG: 10 INJECTION INTRAMUSCULAR; INTRAVENOUS at 12:14

## 2025-06-05 RX ADMIN — CHLORHEXIDINE GLUCONATE 15 ML: 1.2 RINSE ORAL at 11:38

## 2025-06-05 RX ADMIN — EPHEDRINE SULFATE 10 MG: 50 INJECTION, SOLUTION INTRAVENOUS at 13:09

## 2025-06-05 RX ADMIN — PROPOFOL 150 MG: 10 INJECTION, EMULSION INTRAVENOUS at 12:08

## 2025-06-05 SDOH — SOCIAL STABILITY: SOCIAL INSECURITY: RISK FACTORS: SLEEP APNEA

## 2025-06-05 ASSESSMENT — PAIN SCALES - GENERAL
PAINLEVEL_OUTOF10: 2
PAINLEVEL_OUTOF10: 0
PAINLEVEL_OUTOF10: 2
PAINLEVEL_OUTOF10: 0

## 2025-06-05 ASSESSMENT — ENCOUNTER SYMPTOMS: EXERCISE TOLERANCE: GOOD (>4 METS)

## 2025-06-11 LAB
ASR DISCLAIMER: NORMAL
CASE RPRT: NORMAL
CLINICAL INFO: NORMAL
PATH REPORT.ADDENDUM SPEC: NORMAL
PATH REPORT.FINAL DX SPEC: NORMAL
PATH REPORT.FINAL DX SPEC: NORMAL
PATH REPORT.GROSS SPEC: NORMAL

## 2025-06-18 ENCOUNTER — APPOINTMENT (OUTPATIENT)
Dept: INTERNAL MEDICINE | Age: 70
End: 2025-06-18

## 2025-07-11 ENCOUNTER — APPOINTMENT (OUTPATIENT)
Dept: INTERNAL MEDICINE | Age: 70
End: 2025-07-11

## 2025-07-11 ENCOUNTER — CLINICAL ABSTRACT (OUTPATIENT)
Dept: HEALTH INFORMATION MANAGEMENT | Facility: OTHER | Age: 70
End: 2025-07-11

## 2025-07-11 VITALS
HEART RATE: 84 BPM | OXYGEN SATURATION: 98 % | RESPIRATION RATE: 14 BRPM | HEIGHT: 65 IN | DIASTOLIC BLOOD PRESSURE: 78 MMHG | WEIGHT: 132 LBS | SYSTOLIC BLOOD PRESSURE: 126 MMHG | BODY MASS INDEX: 21.99 KG/M2

## 2025-07-11 DIAGNOSIS — M79.7 FIBROMYALGIA: ICD-10-CM

## 2025-07-11 DIAGNOSIS — E78.00 PURE HYPERCHOLESTEROLEMIA: Primary | ICD-10-CM

## 2025-07-11 DIAGNOSIS — I51.7 LVH (LEFT VENTRICULAR HYPERTROPHY): ICD-10-CM

## 2025-07-11 DIAGNOSIS — E55.9 VITAMIN D INSUFFICIENCY: ICD-10-CM

## 2025-07-11 DIAGNOSIS — R94.31 ABNORMAL EKG: ICD-10-CM

## 2025-07-11 DIAGNOSIS — Z91.09 ENVIRONMENTAL ALLERGIES: ICD-10-CM

## 2025-07-11 DIAGNOSIS — G20.A1 PARKINSON'S DISEASE WITHOUT DYSKINESIA OR FLUCTUATING MANIFESTATIONS  (CMD): ICD-10-CM

## 2025-07-11 RX ORDER — AMITRIPTYLINE HYDROCHLORIDE 10 MG/1
20 TABLET ORAL NIGHTLY
Qty: 180 TABLET | Refills: 3 | Status: SHIPPED | OUTPATIENT
Start: 2025-07-11

## 2025-07-11 ASSESSMENT — PATIENT HEALTH QUESTIONNAIRE - PHQ9
CLINICAL INTERPRETATION OF PHQ2 SCORE: NO FURTHER SCREENING NEEDED
1. LITTLE INTEREST OR PLEASURE IN DOING THINGS: NOT AT ALL
SUM OF ALL RESPONSES TO PHQ9 QUESTIONS 1 AND 2: 0
SUM OF ALL RESPONSES TO PHQ9 QUESTIONS 1 AND 2: 0
2. FEELING DOWN, DEPRESSED OR HOPELESS: NOT AT ALL

## 2025-07-11 ASSESSMENT — PAIN SCALES - GENERAL: PAINLEVEL_OUTOF10: 0

## 2025-08-15 ENCOUNTER — HOSPITAL ENCOUNTER (OUTPATIENT)
Dept: CARDIOLOGY | Age: 70
Discharge: HOME | End: 2025-08-15
Attending: INTERNAL MEDICINE

## 2025-08-15 DIAGNOSIS — I51.7 LVH (LEFT VENTRICULAR HYPERTROPHY): ICD-10-CM

## 2025-08-15 DIAGNOSIS — R94.31 ABNORMAL EKG: ICD-10-CM

## 2025-08-15 LAB
AORTIC VALVE AREA (AVA): 0.67
ASCENDING AORTA (AAD): 3
AV MEAN PRESS GRAD SYS DOP V1V2: 3 MM[HG]
AV MEAN VELOCITY (AVMV): 0.81
AV ORIFICE AREA US: 2.61 CM2
AV PEAK GRADIENT (AVPG): 5
AV STENOSIS SEVERITY TEXT: NORMAL
AV VMAX SYS DOP: 1.12
AVI LVOT PEAK GRADIENT (LVOTMG): 1.1
E WAVE DECELARATION TIME (MDT): 11.98
INTERVENTRICULAR SEPTUM IN END DIASTOLE (IVSD): 2.27
LEFT INTERNAL DIMENSION IN SYSTOLE (LVSD): 1.2
LEFT VENTRICULAR INTERNAL DIMENSION IN DIASTOLE (LVDD): 2.3
LEFT VENTRICULAR POSTERIOR WALL IN END DIASTOLE (LVPW): 3.7
LV EF 2D ECHO EST: NORMAL %
LVOT 2D (LVOTD): 18.9
LVOT VTI (LVOTVTI): 0.89
MV E TISSUE VEL LAT (MELV): 1.86
MV E TISSUE VEL MED (MESV): 6.85
MV E WAVE VEL/E TISSUE VEL MED(MSR): 5.55
MV PEAK A VELOCITY (MVPAV): 241
MV PEAK E VELOCITY (MVPEV): 1.08
RV END SYSTOLIC LONGITUDINAL STRAIN FREE WALL (RVGS): 2
TRICUSPID VALVE ANNULAR PEAK VELOCITY (TVAPV): 27
TRICUSPID VALVE PEAK REGURGITATION VELOCITY (TRPV): 2.5
TV ESTIMATED RIGHT ARTERIAL PRESSURE (RAP): 14.5

## 2025-08-15 PROCEDURE — 93306 TTE W/DOPPLER COMPLETE: CPT

## 2025-12-12 ENCOUNTER — APPOINTMENT (OUTPATIENT)
Dept: INTERNAL MEDICINE | Age: 70
End: 2025-12-12

## (undated) DEVICE — Device

## (undated) DEVICE — GLOVE SURG 6.5 PROTEXIS LF CRM PF BEAD CUFF STRL PLISPRN

## (undated) DEVICE — DRESSING NSL NASOPORE L8 CM BIORESBL HYDROPHILIC PLMR STD

## (undated) DEVICE — GLOVE SURG 6.5 PROTEXIS PI MIC PWDR FREE SMTH BEAD CUFF

## (undated) DEVICE — GLOVE SURG 7.5 PROTEXIS ESTM LF CRM PF BEAD CUFF INTLK STRL

## (undated) DEVICE — BLADE SHAVER OD4 MM ROTATABLE OFFSET CUT SRFC IRR TUBE CRV

## (undated) DEVICE — SUTURE CR GUT CR 4-0 RB-1 L27 IN MONO BRN ABS

## (undated) DEVICE — BLADE SHAVER OD2.9 MM M4 ROTATABLE ELEVR STRGT SHAFT IRR

## (undated) DEVICE — TOWEL OR BLU 16X26IN 4PK

## (undated) DEVICE — BLANKET WARMING L60 IN X W36 IN BAIR HGGR PLMR ADULT LWR

## (undated) DEVICE — GLOVE SURG 6.5 PROTEXIS LF BLUE PF SMTH BEAD CUFF INTLK STRL

## (undated) DEVICE — SPLINT NSL THK STD REUTER

## (undated) DEVICE — COAGULATOR SCT L6 IN HNDSWH CORD THRM REDUCTION FLUID EVAC

## (undated) DEVICE — KIT ROOM TURNOVER INFECTION CNTRL GOOD SHEPHERD

## (undated) DEVICE — DRESSING NSL 8CM BIORESBL FIRM PK NASOPORE PLMR STRL LF

## (undated) DEVICE — GLOVE SURG 7 PROTEXIS LF BLUE PF SMTH BEAD CUFF INTLK STRL

## (undated) DEVICE — SUTURE ETHILON MTPS 3-0 PS1 18IN MONO NABSB BLK 1663H

## (undated) DEVICE — GOWN SURG XL L3 NONREINFORCE SET IN SLV STRL LF DISP BLUE

## (undated) DEVICE — TRACKER NVG NONINVASIVE FSN AXIEM

## (undated) DEVICE — TRACKER NVG ELECTROMAGNETIC ENT

## (undated) DEVICE — SUTURE PLN GUT PLN 4-0 SC-1 L18 IN 2 ARM MONO YELLOWISH TAN

## (undated) DEVICE — BLADE SHAVER OD4.3 MM L13 CM QUADCUT STRAIGHTSHOT M4

## (undated) DEVICE — ELECTRODE PT RTN L9 FT VALLEYLAB REM POLYHESIVE ACRYLIC FOAM